# Patient Record
Sex: FEMALE | Race: BLACK OR AFRICAN AMERICAN | NOT HISPANIC OR LATINO | Employment: FULL TIME | URBAN - METROPOLITAN AREA
[De-identification: names, ages, dates, MRNs, and addresses within clinical notes are randomized per-mention and may not be internally consistent; named-entity substitution may affect disease eponyms.]

---

## 2017-10-05 ENCOUNTER — HOSPITAL ENCOUNTER (INPATIENT)
Facility: HOSPITAL | Age: 32
LOS: 2 days | Discharge: HOME | End: 2017-10-07
Attending: OBSTETRICS & GYNECOLOGY | Admitting: OBSTETRICS & GYNECOLOGY
Payer: MEDICAID

## 2017-10-05 DIAGNOSIS — O00.91 ECTOPIC PREGNANCY WITH INTRAUTERINE PREGNANCY, UNSPECIFIED LOCATION: ICD-10-CM

## 2017-10-05 DIAGNOSIS — Z34.90 EARLY STAGE OF PREGNANCY: ICD-10-CM

## 2017-10-05 DIAGNOSIS — O00.90 ECTOPIC PREGNANCY: ICD-10-CM

## 2017-10-05 DIAGNOSIS — N83.519 OVARIAN TORSION: Primary | ICD-10-CM

## 2017-10-05 PROCEDURE — G0379 DIRECT REFER HOSPITAL OBSERV: HCPCS

## 2017-10-05 PROCEDURE — G0378 HOSPITAL OBSERVATION PER HR: HCPCS

## 2017-10-05 PROCEDURE — 11000001 HC ACUTE MED/SURG PRIVATE ROOM

## 2017-10-05 RX ORDER — HYDROMORPHONE HYDROCHLORIDE 2 MG/ML
2 INJECTION, SOLUTION INTRAMUSCULAR; INTRAVENOUS; SUBCUTANEOUS EVERY 4 HOURS PRN
Status: DISCONTINUED | OUTPATIENT
Start: 2017-10-05 | End: 2017-10-06

## 2017-10-05 RX ORDER — SODIUM CHLORIDE, SODIUM LACTATE, POTASSIUM CHLORIDE, CALCIUM CHLORIDE 600; 310; 30; 20 MG/100ML; MG/100ML; MG/100ML; MG/100ML
INJECTION, SOLUTION INTRAVENOUS CONTINUOUS
Status: DISCONTINUED | OUTPATIENT
Start: 2017-10-06 | End: 2017-10-06

## 2017-10-06 ENCOUNTER — SURGERY (OUTPATIENT)
Age: 32
End: 2017-10-06

## 2017-10-06 ENCOUNTER — ANESTHESIA EVENT (OUTPATIENT)
Dept: SURGERY | Facility: HOSPITAL | Age: 32
End: 2017-10-06
Payer: MEDICAID

## 2017-10-06 ENCOUNTER — ANESTHESIA (OUTPATIENT)
Dept: SURGERY | Facility: HOSPITAL | Age: 32
End: 2017-10-06
Payer: MEDICAID

## 2017-10-06 VITALS — HEART RATE: 105 BPM | DIASTOLIC BLOOD PRESSURE: 71 MMHG | SYSTOLIC BLOOD PRESSURE: 128 MMHG | OXYGEN SATURATION: 97 %

## 2017-10-06 PROBLEM — N83.519 OVARIAN TORSION: Status: ACTIVE | Noted: 2017-10-06

## 2017-10-06 LAB
BASOPHILS # BLD AUTO: 0.02 K/UL
BASOPHILS NFR BLD: 0.1 %
DIFFERENTIAL METHOD: ABNORMAL
EOSINOPHIL # BLD AUTO: 0 K/UL
EOSINOPHIL NFR BLD: 0 %
ERYTHROCYTE [DISTWIDTH] IN BLOOD BY AUTOMATED COUNT: 13.2 %
HCG INTACT+B SERPL-ACNC: NORMAL MIU/ML
HCT VFR BLD AUTO: 33 %
HGB BLD-MCNC: 10.9 G/DL
LYMPHOCYTES # BLD AUTO: 1.5 K/UL
LYMPHOCYTES NFR BLD: 7.1 %
MCH RBC QN AUTO: 31.7 PG
MCHC RBC AUTO-ENTMCNC: 33 G/DL
MCV RBC AUTO: 96 FL
MONOCYTES # BLD AUTO: 0.7 K/UL
MONOCYTES NFR BLD: 3.6 %
NEUTROPHILS # BLD AUTO: 18 K/UL
NEUTROPHILS NFR BLD: 88.8 %
PLATELET # BLD AUTO: 347 K/UL
PMV BLD AUTO: 11.3 FL
RBC # BLD AUTO: 3.44 M/UL
WBC # BLD AUTO: 20.3 K/UL

## 2017-10-06 PROCEDURE — 36415 COLL VENOUS BLD VENIPUNCTURE: CPT

## 2017-10-06 PROCEDURE — 25000003 PHARM REV CODE 250: Performed by: OBSTETRICS & GYNECOLOGY

## 2017-10-06 PROCEDURE — 88305 TISSUE EXAM BY PATHOLOGIST: CPT | Mod: 26,,, | Performed by: PATHOLOGY

## 2017-10-06 PROCEDURE — 71000039 HC RECOVERY, EACH ADD'L HOUR: Performed by: OBSTETRICS & GYNECOLOGY

## 2017-10-06 PROCEDURE — 37000009 HC ANESTHESIA EA ADD 15 MINS: Performed by: OBSTETRICS & GYNECOLOGY

## 2017-10-06 PROCEDURE — 25000003 PHARM REV CODE 250: Performed by: NURSE ANESTHETIST, CERTIFIED REGISTERED

## 2017-10-06 PROCEDURE — 84702 CHORIONIC GONADOTROPIN TEST: CPT

## 2017-10-06 PROCEDURE — 63600175 PHARM REV CODE 636 W HCPCS: Performed by: NURSE ANESTHETIST, CERTIFIED REGISTERED

## 2017-10-06 PROCEDURE — 36000709 HC OR TIME LEV III EA ADD 15 MIN: Performed by: OBSTETRICS & GYNECOLOGY

## 2017-10-06 PROCEDURE — 63600175 PHARM REV CODE 636 W HCPCS: Performed by: OBSTETRICS & GYNECOLOGY

## 2017-10-06 PROCEDURE — 0UT50ZZ RESECTION OF RIGHT FALLOPIAN TUBE, OPEN APPROACH: ICD-10-PCS | Performed by: OBSTETRICS & GYNECOLOGY

## 2017-10-06 PROCEDURE — 11000001 HC ACUTE MED/SURG PRIVATE ROOM

## 2017-10-06 PROCEDURE — 71000033 HC RECOVERY, INTIAL HOUR: Performed by: OBSTETRICS & GYNECOLOGY

## 2017-10-06 PROCEDURE — 27201423 OPTIME MED/SURG SUP & DEVICES STERILE SUPPLY: Performed by: OBSTETRICS & GYNECOLOGY

## 2017-10-06 PROCEDURE — 37000008 HC ANESTHESIA 1ST 15 MINUTES: Performed by: OBSTETRICS & GYNECOLOGY

## 2017-10-06 PROCEDURE — 88305 TISSUE EXAM BY PATHOLOGIST: CPT | Performed by: PATHOLOGY

## 2017-10-06 PROCEDURE — 85025 COMPLETE CBC W/AUTO DIFF WBC: CPT

## 2017-10-06 PROCEDURE — 63600175 PHARM REV CODE 636 W HCPCS: Performed by: ANESTHESIOLOGY

## 2017-10-06 PROCEDURE — 58720 REMOVAL OF OVARY/TUBE(S): CPT | Mod: ,,, | Performed by: OBSTETRICS & GYNECOLOGY

## 2017-10-06 PROCEDURE — 0UT00ZZ RESECTION OF RIGHT OVARY, OPEN APPROACH: ICD-10-PCS | Performed by: OBSTETRICS & GYNECOLOGY

## 2017-10-06 PROCEDURE — 36000708 HC OR TIME LEV III 1ST 15 MIN: Performed by: OBSTETRICS & GYNECOLOGY

## 2017-10-06 RX ORDER — PROPOFOL 10 MG/ML
VIAL (ML) INTRAVENOUS
Status: DISCONTINUED | OUTPATIENT
Start: 2017-10-06 | End: 2017-10-06

## 2017-10-06 RX ORDER — HYDROMORPHONE HYDROCHLORIDE 2 MG/ML
2 INJECTION, SOLUTION INTRAMUSCULAR; INTRAVENOUS; SUBCUTANEOUS EVERY 4 HOURS PRN
Status: DISCONTINUED | OUTPATIENT
Start: 2017-10-06 | End: 2017-10-07 | Stop reason: HOSPADM

## 2017-10-06 RX ORDER — ACETAMINOPHEN 10 MG/ML
INJECTION, SOLUTION INTRAVENOUS
Status: DISCONTINUED | OUTPATIENT
Start: 2017-10-06 | End: 2017-10-06

## 2017-10-06 RX ORDER — SODIUM CHLORIDE, SODIUM LACTATE, POTASSIUM CHLORIDE, CALCIUM CHLORIDE 600; 310; 30; 20 MG/100ML; MG/100ML; MG/100ML; MG/100ML
INJECTION, SOLUTION INTRAVENOUS CONTINUOUS PRN
Status: DISCONTINUED | OUTPATIENT
Start: 2017-10-06 | End: 2017-10-06

## 2017-10-06 RX ORDER — ONDANSETRON 2 MG/ML
8 INJECTION INTRAMUSCULAR; INTRAVENOUS EVERY 8 HOURS PRN
Status: DISCONTINUED | OUTPATIENT
Start: 2017-10-06 | End: 2017-10-07 | Stop reason: HOSPADM

## 2017-10-06 RX ORDER — OXYCODONE AND ACETAMINOPHEN 5; 325 MG/1; MG/1
1 TABLET ORAL EVERY 4 HOURS PRN
Status: DISCONTINUED | OUTPATIENT
Start: 2017-10-06 | End: 2017-10-07 | Stop reason: HOSPADM

## 2017-10-06 RX ORDER — OXYCODONE AND ACETAMINOPHEN 5; 325 MG/1; MG/1
1 TABLET ORAL EVERY 4 HOURS PRN
Qty: 30 TABLET | Refills: 0 | Status: SHIPPED | OUTPATIENT
Start: 2017-10-06 | End: 2017-10-20

## 2017-10-06 RX ORDER — BISACODYL 10 MG
10 SUPPOSITORY, RECTAL RECTAL DAILY PRN
Status: DISCONTINUED | OUTPATIENT
Start: 2017-10-06 | End: 2017-10-07 | Stop reason: HOSPADM

## 2017-10-06 RX ORDER — IBUPROFEN 600 MG/1
600 TABLET ORAL EVERY 6 HOURS
Status: DISCONTINUED | OUTPATIENT
Start: 2017-10-07 | End: 2017-10-07 | Stop reason: HOSPADM

## 2017-10-06 RX ORDER — OXYCODONE AND ACETAMINOPHEN 10; 325 MG/1; MG/1
1 TABLET ORAL EVERY 4 HOURS PRN
Status: DISCONTINUED | OUTPATIENT
Start: 2017-10-06 | End: 2017-10-07 | Stop reason: HOSPADM

## 2017-10-06 RX ORDER — ROCURONIUM BROMIDE 10 MG/ML
INJECTION, SOLUTION INTRAVENOUS
Status: DISCONTINUED | OUTPATIENT
Start: 2017-10-06 | End: 2017-10-06

## 2017-10-06 RX ORDER — ATROPINE SULFATE 0.4 MG/ML
INJECTION, SOLUTION ENDOTRACHEAL; INTRAMEDULLARY; INTRAMUSCULAR; INTRAVENOUS; SUBCUTANEOUS
Status: DISCONTINUED | OUTPATIENT
Start: 2017-10-06 | End: 2017-10-06

## 2017-10-06 RX ORDER — NEOSTIGMINE METHYLSULFATE 1 MG/ML
INJECTION, SOLUTION INTRAVENOUS
Status: DISCONTINUED | OUTPATIENT
Start: 2017-10-06 | End: 2017-10-06

## 2017-10-06 RX ORDER — FENTANYL CITRATE 50 UG/ML
INJECTION, SOLUTION INTRAMUSCULAR; INTRAVENOUS
Status: DISCONTINUED | OUTPATIENT
Start: 2017-10-06 | End: 2017-10-06

## 2017-10-06 RX ORDER — ONDANSETRON 2 MG/ML
8 INJECTION INTRAMUSCULAR; INTRAVENOUS ONCE
Status: DISCONTINUED | OUTPATIENT
Start: 2017-10-06 | End: 2017-10-06

## 2017-10-06 RX ORDER — SUCCINYLCHOLINE CHLORIDE 20 MG/ML
INJECTION INTRAMUSCULAR; INTRAVENOUS
Status: DISCONTINUED | OUTPATIENT
Start: 2017-10-06 | End: 2017-10-06

## 2017-10-06 RX ORDER — PHENYLEPHRINE HYDROCHLORIDE 10 MG/ML
INJECTION INTRAVENOUS
Status: DISCONTINUED | OUTPATIENT
Start: 2017-10-06 | End: 2017-10-06

## 2017-10-06 RX ORDER — SODIUM CHLORIDE 0.9 % (FLUSH) 0.9 %
3 SYRINGE (ML) INJECTION
Status: DISCONTINUED | OUTPATIENT
Start: 2017-10-06 | End: 2017-10-06

## 2017-10-06 RX ORDER — LIDOCAINE HCL/PF 100 MG/5ML
SYRINGE (ML) INTRAVENOUS
Status: DISCONTINUED | OUTPATIENT
Start: 2017-10-06 | End: 2017-10-06

## 2017-10-06 RX ORDER — ONDANSETRON HYDROCHLORIDE 2 MG/ML
INJECTION, SOLUTION INTRAMUSCULAR; INTRAVENOUS
Status: DISCONTINUED | OUTPATIENT
Start: 2017-10-06 | End: 2017-10-06

## 2017-10-06 RX ORDER — IBUPROFEN 600 MG/1
600 TABLET ORAL EVERY 6 HOURS
Qty: 30 TABLET | Refills: 0 | Status: SHIPPED | OUTPATIENT
Start: 2017-10-07 | End: 2017-12-13

## 2017-10-06 RX ORDER — HYDROMORPHONE HYDROCHLORIDE 2 MG/ML
0.4 INJECTION, SOLUTION INTRAMUSCULAR; INTRAVENOUS; SUBCUTANEOUS EVERY 5 MIN PRN
Status: DISCONTINUED | OUTPATIENT
Start: 2017-10-06 | End: 2017-10-06 | Stop reason: HOSPADM

## 2017-10-06 RX ADMIN — SODIUM CHLORIDE, SODIUM LACTATE, POTASSIUM CHLORIDE, AND CALCIUM CHLORIDE: .6; .31; .03; .02 INJECTION, SOLUTION INTRAVENOUS at 11:10

## 2017-10-06 RX ADMIN — ACETAMINOPHEN 1000 MG: 10 INJECTION, SOLUTION INTRAVENOUS at 12:10

## 2017-10-06 RX ADMIN — PROPOFOL 50 MG: 10 INJECTION, EMULSION INTRAVENOUS at 12:10

## 2017-10-06 RX ADMIN — HYDROMORPHONE HYDROCHLORIDE 2 MG: 2 INJECTION, SOLUTION INTRAMUSCULAR; INTRAVENOUS; SUBCUTANEOUS at 11:10

## 2017-10-06 RX ADMIN — LIDOCAINE HYDROCHLORIDE 80 MG: 20 INJECTION, SOLUTION INTRAVENOUS at 11:10

## 2017-10-06 RX ADMIN — ROCURONIUM BROMIDE 5 MG: 10 INJECTION, SOLUTION INTRAVENOUS at 11:10

## 2017-10-06 RX ADMIN — HYDROMORPHONE HYDROCHLORIDE 2 MG: 2 INJECTION, SOLUTION INTRAMUSCULAR; INTRAVENOUS; SUBCUTANEOUS at 12:10

## 2017-10-06 RX ADMIN — PROPOFOL 50 MG: 10 INJECTION, EMULSION INTRAVENOUS at 11:10

## 2017-10-06 RX ADMIN — HYDROMORPHONE HYDROCHLORIDE 0.4 MG: 2 INJECTION, SOLUTION INTRAMUSCULAR; INTRAVENOUS; SUBCUTANEOUS at 01:10

## 2017-10-06 RX ADMIN — HYDROMORPHONE HYDROCHLORIDE 2 MG: 2 INJECTION, SOLUTION INTRAMUSCULAR; INTRAVENOUS; SUBCUTANEOUS at 03:10

## 2017-10-06 RX ADMIN — PHENYLEPHRINE HYDROCHLORIDE 100 MCG: 10 INJECTION INTRAVENOUS at 12:10

## 2017-10-06 RX ADMIN — HYDROMORPHONE HYDROCHLORIDE 2 MG: 2 INJECTION, SOLUTION INTRAMUSCULAR; INTRAVENOUS; SUBCUTANEOUS at 04:10

## 2017-10-06 RX ADMIN — HYDROMORPHONE HYDROCHLORIDE 0.4 MG: 2 INJECTION, SOLUTION INTRAMUSCULAR; INTRAVENOUS; SUBCUTANEOUS at 02:10

## 2017-10-06 RX ADMIN — ATROPINE SULFATE 0.5 MG: 0.4 INJECTION, SOLUTION INTRAMUSCULAR; INTRAVENOUS; SUBCUTANEOUS at 12:10

## 2017-10-06 RX ADMIN — HYDROMORPHONE HYDROCHLORIDE 2 MG: 2 INJECTION, SOLUTION INTRAMUSCULAR; INTRAVENOUS; SUBCUTANEOUS at 07:10

## 2017-10-06 RX ADMIN — SODIUM CHLORIDE, SODIUM LACTATE, POTASSIUM CHLORIDE, AND CALCIUM CHLORIDE: 600; 310; 30; 20 INJECTION, SOLUTION INTRAVENOUS at 12:10

## 2017-10-06 RX ADMIN — SODIUM CHLORIDE, SODIUM LACTATE, POTASSIUM CHLORIDE, AND CALCIUM CHLORIDE: 600; 310; 30; 20 INJECTION, SOLUTION INTRAVENOUS at 08:10

## 2017-10-06 RX ADMIN — PROMETHAZINE HYDROCHLORIDE 25 MG: 25 INJECTION INTRAMUSCULAR; INTRAVENOUS at 04:10

## 2017-10-06 RX ADMIN — PROPOFOL 150 MG: 10 INJECTION, EMULSION INTRAVENOUS at 11:10

## 2017-10-06 RX ADMIN — ROCURONIUM BROMIDE 15 MG: 10 INJECTION, SOLUTION INTRAVENOUS at 12:10

## 2017-10-06 RX ADMIN — SUCCINYLCHOLINE CHLORIDE 120 MG: 20 INJECTION, SOLUTION INTRAMUSCULAR; INTRAVENOUS at 11:10

## 2017-10-06 RX ADMIN — ONDANSETRON 8 MG: 2 INJECTION, SOLUTION INTRAMUSCULAR; INTRAVENOUS at 12:10

## 2017-10-06 RX ADMIN — FENTANYL CITRATE 50 MCG: 50 INJECTION, SOLUTION INTRAMUSCULAR; INTRAVENOUS at 01:10

## 2017-10-06 RX ADMIN — NEOSTIGMINE METHYLSULFATE 3 MG: 1 INJECTION INTRAVENOUS at 12:10

## 2017-10-06 RX ADMIN — FENTANYL CITRATE 50 MCG: 50 INJECTION, SOLUTION INTRAMUSCULAR; INTRAVENOUS at 12:10

## 2017-10-06 RX ADMIN — FENTANYL CITRATE 100 MCG: 50 INJECTION, SOLUTION INTRAMUSCULAR; INTRAVENOUS at 11:10

## 2017-10-06 NOTE — PLAN OF CARE
Pt meets PACU discharge criteria. Pt signed out of PACU by Dr Guevara. Report called to Lawrence Cobb

## 2017-10-06 NOTE — H&P
GYNECOLOGY  :  Janie Barbosa is a 31 y.o.    Patient transferred from [facility]  , where she consulted for abdominal pain, severe, on the right lower quadrant, and vaginal bleeding . Patient  Knew was pregnant,,had seen a physician in [facility]   Pain started about 4 days ago , more severe yesterday  EValuated on ED outside facility, US reported ovarian mass possible ectopic pregnancy vs ovarian cyst , and no viable intrauterine pregnancy  Pain has been moderate since arrival , vaginal bleeding  Minimal   No fever, no nausea or vomiting , no GI symptoms            No past medical history on file.  Past Surgical History:   Procedure Laterality Date     SECTION      CYST REMOVAL      DILATION AND CURETTAGE OF UTERUS      SINUS SURGERY       No family history on file.  Social History   Substance Use Topics    Smoking status: Never Smoker    Smokeless tobacco: Never Used    Alcohol use No     OB History    Para Term  AB Living   2 0 0   2 2   SAB TAB Ectopic Multiple Live Births                  # Outcome Date GA Lbr Prosper/2nd Weight Sex Delivery Anes PTL Lv   2 AB            1 AB                   /69 (BP Location: Right arm, Patient Position: Lying)   Pulse 92   Temp 98.4 °F (36.9 °C) (Oral)   Resp 18   LMP 2017 Comment: not sure if she pregnant  SpO2 100%         COMPREHENSIVE GYN HISTORY:       PAP History: Denies abnormal Paps.  Infection History: Denies STDs. Denies PID.  Benign History: Denies uterine fibroids. Denies ovarian cysts. Denies endometriosis.   Cancer History: Denies cervical cancer. Denies uterine cancer or hyperplasia. Denies ovarian cancer. Denies vulvar cancer or pre-cancer. Denies vaginal cancer or pre-cancer. Denies breast cancer. Denies colon cancer.  Sexual Activity History: ( x ) Yes   (  )   no       ROS  GENERAL: Denies significant weight gain or weight loss. Feeling well overall.  SKIN: Denies rash or lesions.  Normal  skin turgor  HEAD: Denies head injury or headache.   NODES: Denies enlarged lymph nodes.   CHEST: Denies chest pain or shortness of breath.   CARDIOVASCULAR: Denies palpitations or left sided chest pain.   ABDOMEN: Per HPIURINARY: normal  Frequency,no  Dysuria or burning on urination.   REPRODUCTIVE: Per HPI   BREASTS: The patient sometimes performs breast self-examination and denies pain, lumps, or nipple discharge.   HEMATOLOGIC: No easy bruisability or excessive bleeding.   MUSCULOSKELETAL: Denies joint pain or swelling.   NEUROLOGIC: Denies syncope or weakness.   PSYCHIATRIC: Denies depression, anxiety or mood swings.    Physical Exam     Constitutional: She is oriented to person, place, and time. She appears well-developed and well-nourished. No distress.   HENT:   Head: Normocephalic.   NECK: Neck symmetric without masses or thyromegaly.  Cardiovascular: Normal rate and regular rhythm.   Pulmonary/Chest: Effort normal and breath sounds normal. No respiratory distress. She has no wheezes.   Breasts: Symmetrical, no skin changes or visible lesions. No palpable masses, nipple discharge or adenopathy bilaterally.  Abdominal: Distended, very tender mostly llower abdomen and right lower quadrant  Positive guarding but not clear rebound   Genitourinary: Pelvic exam was performed with patient supine.   External genitalia normal no lesions.Normal hair distribution   Adequate perineal body,normal urethral meatus   Bimanual exam   Cervix closed, positive CMT, cervix is displaced anteriorly, and posterior cul de sac feels full and extremely tender to palpation   Difficult to feel uterus or adnexa due to  Patient discomfort and abdominal guarding     Extremities normal no cyanosis ,edema.     Procedures:  Lab Results   Component Value Date    WBC 20.30 (H) 10/06/2017    HGB 10.9 (L) 10/06/2017    HCT 33.0 (L) 10/06/2017    MCV 96 10/06/2017     10/06/2017     C = 7385  Pelvic US =  (telephonic report given by  radiologist to me) .  Viable intrauterine pregnancy ,   Right adnexal mass  about 8 cms, cystic ,hemorrhagic, free fluid in cul de sac  Doppler= absent flow to right ovary concerning for torsion     A/P Janie Barbosa 31 y.o.     Dx=  1- Acute abdomen   2- Pelvic Mass   3- Intrauterine pregnancy       Threatened miscarriage   4- Ruptured and possible torsion of  hemorrhagic ovarian cyst       Plan:  With this clinical and Ultrasound findings   It is decided for surgical management   Will start with diagnostic laparoscopy , and per findings ovarian cystectomy possible   oophorectomy   Expectant management for now of intrauterine pregnancy , the patient is already bleeding and the future outcome is still unknown  Patient counseled about possible effects of surgery in this early pregnancy.   Patient agrees with plan  Consents signed   Will proceed with surgery     David Gary M.D.   OB/GYN

## 2017-10-06 NOTE — OP NOTE
OPERATIVE REPORT:    Procedure date: 10/6/2017    PREOPERATIVE DIAGNOSIS  1.Pelvic mass possible ovarian torsion  2-IUP (early )       POSTOPERATIVE DIAGNOSIS  1 Right ovarian Torsion   2-Hemoperitoneum     PROCEDURE:   Diagnostic laparoscopy converted to exploratory  laparotomy     Right salpingo- oophorectomy   Pelvic washout         SURGEON: Dr.Juancarlos Gary  ASSISTANT: Dana ilang     ANESTHESIA: General    COMPLICATIONS: None    EBL:less 200  Cc    Specimen to Pathology : right tube and ovary       URINE OUTPUT: clear at the end     FINDINGS:   Uterus = normal   right ovary=  Ovarian torsion, hemorrhagic , increased in size to 7-8 cms, ruptured , bleeding,   Multiple clots and free blood in pelvic              David Gary M.D.   OB/GYN    10/6/2017

## 2017-10-06 NOTE — PROGRESS NOTES
"Pt sitting up in bed with significant other at side.  Assessment complete.  See flowsheet.  PT denies c/o pain while lying in bed.  Pain on ride side when palpating but no rebound tenderness.  Pt denies vaginal bleeding.      0755  Dr Gary at bedside assessng patient.  SVE done per MD and "old" brown blood noted to glove after exam.  Pt tolerated well.  Order received for beta HCG and pt will go for ultrasound this A.M.    0805  Ultrasound tech called unit and will send for patient through transport services.  Notified pt that transport personnel will be coming soon to bring herto ultrasound.  Pt verbalized understanding.    0857  Pt transported to /S via wheelchair  0957  Pt arrived back in her room from /s  1124  Pt transported to surgery via stretcher in stable condition.  Dr Gary will see patient and get informed consent in surgery holding prior to surgery.      1435  Pt arrived to room 352 via stretcher in stable condition    1445  Pt awake and oriented.  Assessment complete.  See flowsheet.  IV to left AC leaking and looks to be reinforced with gauze from surgery.  IVF stopped and will restart IV.    1450  Attempts to start IV to right hand without success  1500  IV restarted to right AC.  Pt tolerated well.  1530  Dr Gary at bedside explaining procedure to aptient.  PT verbalized understanding.  Pt c/o incisional pain.  Orders received for IV pain medication since pt not eating yet.  "

## 2017-10-06 NOTE — PLAN OF CARE
Pt arrived to unit. Patient oriented to room. Full assessment completed. VSS. See flowsheet.     5727 Spoke to Dr. Gary about patient arriving to unit and crying complaining pain 10/10 to R lower abdomen. VSS. New orders received. MD states will assess patient in AM.     0000 Pt medicated for pain. Plan of care discussed with patient. Pt verbalizes full understanding.    0030 Pt resting comfortably in bed. No complaints or needs verbalized at this time.     0415 Pt c/o pain, nausea, and vomiting. Dilaudid and Phenergan given as ordered. Patient states the Dilaudid is helping relieve her pain and she was able to sleep.

## 2017-10-06 NOTE — TRANSFER OF CARE
Anesthesia Transfer of Care Note    Patient: Janie Barbosa    Procedure(s) Performed: Procedure(s) (LRB):  LAPAROSCOPY-DIAGNOSTIC (Right)  SALPINGO-OOPHERECTOMY (Right)    Patient location: PACU    Anesthesia Type: general    Transport from OR: Transported from OR on 6-10 L/min O2 by face mask with adequate spontaneous ventilation    Post pain: adequate analgesia    Post assessment: no apparent anesthetic complications    Post vital signs: stable    Level of consciousness: sedated    Nausea/Vomiting: no nausea/vomiting    Complications: none    Transfer of care protocol was followed      Last vitals:   Visit Vitals  /75   Pulse (!) 54   Temp 36.3 °C (97.3 °F) (Temporal)   Resp 14   Wt 71.7 kg (158 lb)   LMP 08/26/2017   SpO2 99%   BMI 27.12 kg/m²

## 2017-10-06 NOTE — OP NOTE
OPERATIVE REPORT:    Procedure date: 10/6/2017    PREOPERATIVE DIAGNOSIS  1.Pelvic mass / Hemorrhagic Ovarian cyst     Possible Ovarian torsion    Intrauterine  pregnancy     POSTOPERATIVE DIAGNOSIS  1.Ovarian torsion      Right ruptured hemorrhagic cyst      Hemoperitoneum     PROCEDURE:   Diagnostic laparoscopy converted to laparotomy   Right Salpingo oophorectomy  + pelvic washout       SURGEON: Dr.Juancarlos Gary  ASSISTANT: Dana liang     ANESTHESIA: General    COMPLICATIONS: None    EBL: about 200  Cc of blood found  in pelvis and abdominal cavity       Specimen to Pathology : Right Tube and ovary       URINE OUTPUT: clear at the end     FINDINGS:   Uterus = Increased in size , normal surface   Normal left tube and ovary   Right adnexa.  Big irregular hemorrhagic mass, about 8x9 cms ruptured and bleeding ,originated in the right ovary, tube is swollen and  Involved in the mass.   Long pedicle , appears twisted on its axis  Hemoperitoneum 200 cc aprox            PROCEDURE:   Patient was taking to the operating room where general anesthesia was administered and found to be adequate.   She was prepped and draped in the dorsal lithotomy position.a sponge stick was placed in vagina   Gloves were changed. Infraumbilical incision 1.5 cm was made with scalpel A Veress needle was inserted into the umbilicus under tenting of the anterior abdominal wall. Placement into the peritoneal cavity was confirmed via saline drop test. The abdomen was insufflated to 15mm Hg using Carbon dioxide. A 10 mm Optiview trocar was advanced through this incision. Excellent hemostasis was noted. The patient was placed in deep Trendelenburg. An 5 mm left lateral skin incision was made with a scalpel and a 5 mm trocar was advanced through this incision under visualization of the camera. A 5 mm right lateral skin incision was made with the scalpel. A 5 mm trocar was advanced through this incision under visualization of the camera.  Excellent hemostasis was noted.     Evaluation of pelvic cavity was performed with findings described.  Using the aspirator, we try to aspirate cyst contents but are  thick and organized. Bleeding is active.   It is decided at this point o convert to open laparotomy .  A Pfannenstiel laparotomy incision was made and was extended Through the subcutaneous tissue to the fascia, the fascia was incised  Transversally and muscles were dissected off the fascia with Bovie and curved mora scissors   ,the midline was identified and rectus muscles were  laterally, then parietal peritoneum was tented up and entered , exposing the pelvic organs with findings described.  An O'emiliano-Snow retractor was placed and 3 moist laps were  used to pack and retract bowel.  Pelvic mass identified, and using Liga Sure impact device, the infundibulopelvic ligament is double cauterized and cut,  Mesosalpinx is also cauterized and cut removing ovary and tube in bulk.. Pelvis was irrigated and all clots in anterior and posterior cul de sac removed .   All instruments removed from abdomen, lap/sponges counts were made  Peritoneum is closed with 2-0 CC, fascia with 0 PDS  , subcutaneous with plain catgut  Running non locking and skin with 4-0 monocril subcuticular    Patient was awakened and taken to recovery room in good condition .  No complications.  Urine clear a the endo of procedure        David Gary M.D.   OB/GYN    10/6/2017

## 2017-10-06 NOTE — ANESTHESIA POSTPROCEDURE EVALUATION
Anesthesia Post Evaluation    Patient: Janie Barbosa    Procedure(s) Performed: Procedure(s) (LRB):  LAPAROSCOPY-DIAGNOSTIC (Right)  SALPINGO-OOPHERECTOMY (Right)    Final Anesthesia Type: general  Patient location during evaluation: PACU  Patient participation: Yes- Able to Participate  Level of consciousness: awake and alert  Post-procedure vital signs: reviewed and stable  Pain management: adequate  Airway patency: patent  PONV status at discharge: No PONV  Anesthetic complications: no      Cardiovascular status: hemodynamically stable and blood pressure returned to baseline  Respiratory status: unassisted and spontaneous ventilation  Hydration status: euvolemic  Follow-up not needed.        Visit Vitals  /66   Pulse 72   Temp 36.3 °C (97.3 °F) (Temporal)   Resp 12   Wt 71.7 kg (158 lb)   LMP 08/26/2017   SpO2 98%   BMI 27.12 kg/m²       Pain/My Score: Pain Assessment Performed: Yes (10/6/2017  2:20 PM)  Presence of Pain: complains of pain/discomfort (10/6/2017  2:20 PM)  Pain Rating Prior to Med Admin: 8 (10/6/2017  2:13 PM)  Pain Rating Post Med Admin: 0 (10/6/2017  4:45 AM)  My Score: 9 (10/6/2017  2:20 PM)

## 2017-10-06 NOTE — ANESTHESIA PREPROCEDURE EVALUATION
10/06/2017  Janie Barbosa is a 31 y.o., female with ovarian torsion, IUP at 7 weeks and bleeding, nausea for diagnostic laparoscopy under GETA.     Anesthesia Evaluation     I have reviewed the Nursing Notes.   I have reviewed the Medications.     Review of Systems  Anesthesia Hx:   Denies Personal Hx of Anesthesia complications.   Social:  Non-Smoker, No Alcohol Use   Cardiovascular:   Exercise tolerance: good    Pulmonary:  Pulmonary Normal    OB/GYN/PEDS:  Ovarian torsion       Physical Exam  General:  Well nourished    Airway/Jaw/Neck:  Airway Findings: Mouth Opening: Normal Tongue: Normal  General Airway Assessment: Adult  Mallampati: I  TM Distance: Normal, at least 6 cm      Dental:  Dental Findings: In tact   Chest/Lungs:  Chest/Lungs Clear    Heart/Vascular:  Heart Findings: Normal       Mental Status:  Mental Status Findings:  Alert and Oriented       Lab Results   Component Value Date    WBC 20.30 (H) 10/06/2017    HGB 10.9 (L) 10/06/2017    HCT 33.0 (L) 10/06/2017     10/06/2017    ALT 20 10/05/2017    AST 15 10/05/2017     10/05/2017    K 3.3 (L) 10/05/2017     10/05/2017    CREATININE 0.66 10/05/2017    BUN 7 10/05/2017    CO2 17 (L) 10/05/2017    INR 1.0 11/24/2008         Anesthesia Plan  Type of Anesthesia, risks & benefits discussed:  Anesthesia Type:  general  Patient's Preference:   Intra-op Monitoring Plan:   Intra-op Monitoring Plan Comments:   Post Op Pain Control Plan:   Post Op Pain Control Plan Comments:   Induction:    Beta Blocker:  Patient is not currently on a Beta-Blocker (No further documentation required).       Informed Consent: Patient understands risks and agrees with Anesthesia plan.  Questions answered. Anesthesia consent signed with patient.  ASA Score: 1  emergent   Day of Surgery Review of History & Physical:            Ready For Surgery From  Anesthesia Perspective.

## 2017-10-07 VITALS
WEIGHT: 158 LBS | DIASTOLIC BLOOD PRESSURE: 61 MMHG | BODY MASS INDEX: 27.12 KG/M2 | OXYGEN SATURATION: 100 % | TEMPERATURE: 98 F | SYSTOLIC BLOOD PRESSURE: 110 MMHG | HEART RATE: 113 BPM | RESPIRATION RATE: 16 BRPM

## 2017-10-07 LAB
BASOPHILS # BLD AUTO: 0.02 K/UL
BASOPHILS NFR BLD: 0.1 %
DIFFERENTIAL METHOD: ABNORMAL
EOSINOPHIL # BLD AUTO: 0 K/UL
EOSINOPHIL NFR BLD: 0.1 %
ERYTHROCYTE [DISTWIDTH] IN BLOOD BY AUTOMATED COUNT: 13.4 %
HCT VFR BLD AUTO: 30.4 %
HGB BLD-MCNC: 9.9 G/DL
LYMPHOCYTES # BLD AUTO: 2.1 K/UL
LYMPHOCYTES NFR BLD: 15.5 %
MCH RBC QN AUTO: 31.5 PG
MCHC RBC AUTO-ENTMCNC: 32.6 G/DL
MCV RBC AUTO: 97 FL
MONOCYTES # BLD AUTO: 1.6 K/UL
MONOCYTES NFR BLD: 11.8 %
NEUTROPHILS # BLD AUTO: 10 K/UL
NEUTROPHILS NFR BLD: 72.2 %
PLATELET # BLD AUTO: 327 K/UL
PMV BLD AUTO: 11 FL
RBC # BLD AUTO: 3.14 M/UL
WBC # BLD AUTO: 13.8 K/UL

## 2017-10-07 PROCEDURE — 63600175 PHARM REV CODE 636 W HCPCS: Performed by: OBSTETRICS & GYNECOLOGY

## 2017-10-07 PROCEDURE — 85025 COMPLETE CBC W/AUTO DIFF WBC: CPT

## 2017-10-07 PROCEDURE — 36415 COLL VENOUS BLD VENIPUNCTURE: CPT

## 2017-10-07 RX ADMIN — HYDROMORPHONE HYDROCHLORIDE 2 MG: 2 INJECTION, SOLUTION INTRAMUSCULAR; INTRAVENOUS; SUBCUTANEOUS at 03:10

## 2017-10-07 NOTE — PROGRESS NOTES
POD #1    S: Patient doing well. Tolerating PO. Passing flatus. Ambulating. Pain controlled. No vaginal bleeding.    O  Temp:  [97.3 °F (36.3 °C)-98.7 °F (37.1 °C)] 98.4 °F (36.9 °C)  Pulse:  [] 113  Resp:  [12-20] 16  SpO2:  [97 %-100 %] 100 %  BP: ()/(61-88) 110/61   Gen: A&ox3, nAD  Abd: soft, incision c/d/i, no ttp  Ext: no edema    Lab Results   Component Value Date    WBC 13.80 (H) 10/07/2017    HGB 9.9 (L) 10/07/2017    HCT 30.4 (L) 10/07/2017    MCV 97 10/07/2017     10/07/2017       AP: s/p exlap and RSO, for right ovarian torsion, doing well  - continue routine postop care  -discharge home today  -repeat pelvic US completed yesterday and continues to show IUP @ 6+ weeks, +DANIELA young MD

## 2017-10-07 NOTE — DISCHARGE SUMMARY
[facility]  Obstetrics  Discharge Summary      Patient Name: Janie Barbosa  MRN: [id number]  Admission Date: 10/5/2017  Hospital Length of Stay: 1 days  Discharge Date and Time:  10/07/2017 11:29 AM  Attending Physician: David Gary MD   Discharging Provider: Austen Lciona MD  Primary Care Provider: Primary Doctor No    HPI: 32 yo now  female with IUP at 6wks gestation admitted for management of right ovarian torsion.    Procedure(s) (LRB):  LAPAROSCOPY-DIAGNOSTIC (Right)  SALPINGO-OOPHERECTOMY (Right)     Hospital Course: The patient's procedure was uncomplicated. Please see Dr Gary' operative note for details. The patient's postop course was uncomplicated. On day of discharge, she was tolerating PO. Her pain was well controlled. She was voiding, ambulating.    Final Active Diagnoses:    Diagnosis Date Noted POA    PRINCIPAL PROBLEM:  Ovarian torsion [N83.519] 10/06/2017 Yes    Ectopic pregnancy [O00.90] 10/05/2017 Yes      Problems Resolved During this Admission:    Diagnosis Date Noted Date Resolved POA        Immunizations     None          This patient has no babies on file.  Pending Diagnostic Studies:     None          Discharged Condition: good    Disposition: Home or Self Care    Follow Up:  Follow-up Information     David Gary MD In 1 week.    Specialty:  Obstetrics and Gynecology  Contact information:  [address]  [address] [place] [address] [id number] [phone]                 Patient Instructions:     Diet general     Activity as tolerated       Medications:  Current Discharge Medication List      START taking these medications    Details   ibuprofen (ADVIL,MOTRIN) 600 MG tablet Take 1 tablet (600 mg total) by mouth every 6 (six) hours.  Qty: 30 tablet, Refills: 0      oxycodone-acetaminophen (PERCOCET) 5-325 mg per tablet Take 1 tablet by mouth every 4 (four) hours as needed.  Qty: 30 tablet, Refills: 0         CONTINUE these medications which have NOT  CHANGED    Details   ondansetron (ZOFRAN-ODT) 8 MG TbDL Take 1 tablet (8 mg total) by mouth every 12 (twelve) hours as needed (nausea and vomiting).  Qty: 30 tablet, Refills: 0      pantoprazole (PROTONIX) 20 MG tablet Take 1 tablet (20 mg total) by mouth once daily.  Qty: 30 tablet, Refills: 0             Austen Licona MD  Obstetrics [facility]

## 2017-10-07 NOTE — PLAN OF CARE
Problem: Patient Care Overview  Goal: Plan of Care Review  Outcome: Ongoing (interventions implemented as appropriate)  Pt tolerating regular diet, robbins catheter to be discontinued at 0600, vss, nad.  Pain managed well with ordered medication.

## 2017-10-07 NOTE — PROGRESS NOTES
Discharge instructions given.  Pt verbalized understanding    1240  Discharged via wheelchair in stable condition;  NPIS 0/10

## 2017-10-07 NOTE — DISCHARGE INSTRUCTIONS
It often takes 1-4 weeks to recover from your surgery.  This varies from women to women.    To avoid constipation, eat fruits, vegetables, and whole grains.  Drink plenty of water.  Your doctor may suggest that you use a laxative or a mild stool softener.    Increase activity gradually.  Ask your friends and family to help with shores and errands while you recover.      Do not lift anything over 10 pounds to avoid straining your incision.    You may shower    Do not put anything in your vagina until your doctor says it is safe to do so.  This includes tampons, douching and sexual intercourse.    CALL YOUR DOCTOR    *Chills or a fever of 100.4 or higher    *Bright red vaginal bleeding or foul smelling discharge    *Difficulty urinating or burning during urination    *Severe abdominal pain or bloating    *Red, swollen, draining or bleeding incision site.

## 2017-10-09 ENCOUNTER — TELEPHONE (OUTPATIENT)
Dept: OBSTETRICS AND GYNECOLOGY | Facility: CLINIC | Age: 32
End: 2017-10-09

## 2017-10-09 NOTE — TELEPHONE ENCOUNTER
----- Message from Georgia Anderson sent at 10/9/2017 12:54 PM CDT -----  Contact: [phone]  Patient is requesting to speak with you regarding an appt. asap for a post-op

## 2017-10-19 ENCOUNTER — TELEPHONE (OUTPATIENT)
Dept: OBSTETRICS AND GYNECOLOGY | Facility: HOSPITAL | Age: 32
End: 2017-10-19

## 2017-10-20 ENCOUNTER — OFFICE VISIT (OUTPATIENT)
Dept: OBSTETRICS AND GYNECOLOGY | Facility: CLINIC | Age: 32
End: 2017-10-20
Payer: COMMERCIAL

## 2017-10-20 VITALS
DIASTOLIC BLOOD PRESSURE: 60 MMHG | SYSTOLIC BLOOD PRESSURE: 116 MMHG | HEIGHT: 64 IN | BODY MASS INDEX: 26.79 KG/M2 | WEIGHT: 156.94 LBS

## 2017-10-20 DIAGNOSIS — Z98.890 POSTOPERATIVE STATE: Primary | ICD-10-CM

## 2017-10-20 PROCEDURE — 99024 POSTOP FOLLOW-UP VISIT: CPT | Mod: S$GLB,,, | Performed by: OBSTETRICS & GYNECOLOGY

## 2017-10-20 PROCEDURE — 99999 PR PBB SHADOW E&M-EST. PATIENT-LVL II: CPT | Mod: PBBFAC,,, | Performed by: OBSTETRICS & GYNECOLOGY

## 2017-10-23 ENCOUNTER — TELEPHONE (OUTPATIENT)
Dept: OBSTETRICS AND GYNECOLOGY | Facility: CLINIC | Age: 32
End: 2017-10-23

## 2017-10-23 NOTE — TELEPHONE ENCOUNTER
----- Message from Luciana Gilmore sent at 10/23/2017  9:22 AM CDT -----  Contact: self/[phone]  Patient called to speak with nurse about getting a return to work excuse and also when will she be released to return to work.    Please call and advise.

## 2017-10-26 ENCOUNTER — TELEPHONE (OUTPATIENT)
Dept: OBSTETRICS AND GYNECOLOGY | Facility: CLINIC | Age: 32
End: 2017-10-26

## 2017-11-01 NOTE — PROGRESS NOTES
"Janie Barbosa is a 31 y.o. female  post-op from a  Laparotomy , Right salping oophorectomy  on 10/6/17 .  Patient is Doing well postoperatively.      The pathology revealed:  Benign  Hemorrhagic cyst       History reviewed. No pertinent past medical history.  Past Surgical History:   Procedure Laterality Date     SECTION      CYST REMOVAL      DILATION AND CURETTAGE OF UTERUS      ECTOPIC PREGNANCY SURGERY      SINUS SURGERY       History reviewed. No pertinent family history.  Social History   Substance Use Topics    Smoking status: Never Smoker    Smokeless tobacco: Never Used    Alcohol use No     OB History    Para Term  AB Living   5 2 2   2 2   SAB TAB Ectopic Multiple Live Births           2      # Outcome Date GA Lbr Prosper/2nd Weight Sex Delivery Anes PTL Lv   5 Term 07    F CS-LTranv   DANDRE   4 Term 04    M CS-LTranv   DANDRE   3             2 AB            1 AB                   /60   Ht 5' 4" (1.626 m)   Wt 71.2 kg (156 lb 15.5 oz)   LMP 2017 Comment: not sure if she pregnant  BMI 26.94 kg/m²     ROS:  GENERAL: No fever, chills, fatigability or weight loss.  VULVAR: No pain, no lesions and no itching.  VAGINAL: No relaxation, no itching, no discharge, no abnormal bleeding and no lesions.  ABDOMEN: No abdominal pain. Denies nausea. Denies vomiting. No diarrhea. No constipation  BREAST: Denies pain. No lumps. No discharge.  URINARY: No incontinence, no nocturia, no frequency and no dysuria.  CARDIOVASCULAR: No chest pain. No shortness of breath. No leg cramps.  NEUROLOGICAL: No headaches. No vision changes.    PE:   ABDOMEN:  Soft , not distended,not tender  BS present. incisions healing, clean, intact.   PELVIC      Normal external genitalia without lesions.  Normal hair distribution.  Adequate perineal body, normal urethral meatus.  Vagina moist and well rugated without lesions or discharge.  Cervix pink, without lesions, discharge or " tenderness.  No significant cystocele or rectocele.  Bimanual exam shows uterus to be normal size, regular, mobile and nontender.  Adnexa without masses or tenderness.        A/P   1- Satisfactory postoperative   2-s/p  Laparotomy salpingo oophorectomy      Intra operative findings and pathology results discussed with patient    Follow up   4 weeks   Or with her OB at [facility]         David Gary M.D.   OB/GYN

## 2017-12-13 ENCOUNTER — ROUTINE PRENATAL (OUTPATIENT)
Dept: OBSTETRICS AND GYNECOLOGY | Facility: CLINIC | Age: 32
End: 2017-12-13
Payer: COMMERCIAL

## 2017-12-13 VITALS
BODY MASS INDEX: 26.64 KG/M2 | WEIGHT: 155.19 LBS | SYSTOLIC BLOOD PRESSURE: 100 MMHG | DIASTOLIC BLOOD PRESSURE: 62 MMHG

## 2017-12-13 DIAGNOSIS — Z98.890 POSTOPERATIVE STATE: Primary | ICD-10-CM

## 2017-12-13 PROCEDURE — 99999 PR PBB SHADOW E&M-EST. PATIENT-LVL II: CPT | Mod: PBBFAC,,, | Performed by: OBSTETRICS & GYNECOLOGY

## 2017-12-13 PROCEDURE — 99024 POSTOP FOLLOW-UP VISIT: CPT | Mod: S$GLB,,, | Performed by: OBSTETRICS & GYNECOLOGY

## 2017-12-13 RX ORDER — DOXYLAMINE SUCCINATE AND PYRIDOXINE HYDROCHLORIDE 10; 10 MG/1; MG/1
TABLET, DELAYED RELEASE ORAL
COMMUNITY
Start: 2017-11-02 | End: 2019-03-24

## 2017-12-13 NOTE — PROGRESS NOTES
Janie Barbosa is a 32 y.o. female  post-op from a  Laparotomy , Right salping oophorectomy  on 10/6/17 .  Patient is Doing well postoperatively.      The pathology revealed:  Benign  Hemorrhagic cyst       History reviewed. No pertinent past medical history.  Past Surgical History:   Procedure Laterality Date     SECTION      CYST REMOVAL      DILATION AND CURETTAGE OF UTERUS      ECTOPIC PREGNANCY SURGERY      SINUS SURGERY       History reviewed. No pertinent family history.  Social History   Substance Use Topics    Smoking status: Never Smoker    Smokeless tobacco: Never Used    Alcohol use No     OB History    Para Term  AB Living   6 2 2   2 2   SAB TAB Ectopic Multiple Live Births           2      # Outcome Date GA Lbr Prosper/2nd Weight Sex Delivery Anes PTL Lv   6 Current            5 Term 07    F CS-LTranv   DANDRE   4 Term 04    M CS-LTranv   DANDRE   3             2 AB            1 AB                   /62   Wt 70.4 kg (155 lb 3.3 oz)   LMP 2017 Comment: not sure if she pregnant  BMI 26.64 kg/m²     ROS:  GENERAL: No fever, chills, fatigability or weight loss.  VULVAR: No pain, no lesions and no itching.  VAGINAL: No relaxation, no itching, no discharge, no abnormal bleeding and no lesions.  ABDOMEN: No abdominal pain. Denies nausea. Denies vomiting. No diarrhea. No constipation  BREAST: Denies pain. No lumps. No discharge.  URINARY: No incontinence, no nocturia, no frequency and no dysuria.  CARDIOVASCULAR: No chest pain. No shortness of breath. No leg cramps.  NEUROLOGICAL: No headaches. No vision changes.    PE:   ABDOMEN:  FH 15 cms . (+) fetal heart tones )     Soft , not distended,not tender  BS present. incisions healing, clean, intact.   PELVIC      Normal external genitalia without lesions.  Normal hair distribution.  Adequate perineal body, normal urethral meatus.  Vagina moist and well rugated without lesions or discharge.  Cervix  pink, without lesions, discharge or tenderness.  No significant cystocele or rectocele.  Bimanual exam shows uterus to be normal size, regular, mobile and nontender.  Adnexa without masses or tenderness.        A/P   1- Satisfactory postoperative   2-s/p  Laparotomy salpingo oophorectomy      Intra operative findings and pathology results discussed with patient    Follow up   Will follow up with her OB at [facility]  For the rest of the pregnancy           David Gary M.D.   OB/GYN

## 2019-06-26 ENCOUNTER — NURSE TRIAGE (OUTPATIENT)
Dept: ADMINISTRATIVE | Facility: CLINIC | Age: 34
End: 2019-06-26

## 2020-04-21 DIAGNOSIS — Z01.84 ANTIBODY RESPONSE EXAMINATION: ICD-10-CM

## 2020-05-21 DIAGNOSIS — Z01.84 ANTIBODY RESPONSE EXAMINATION: ICD-10-CM

## 2020-06-20 DIAGNOSIS — Z01.84 ANTIBODY RESPONSE EXAMINATION: ICD-10-CM

## 2020-07-20 DIAGNOSIS — Z01.84 ANTIBODY RESPONSE EXAMINATION: ICD-10-CM

## 2020-08-19 DIAGNOSIS — Z01.84 ANTIBODY RESPONSE EXAMINATION: ICD-10-CM

## 2020-09-18 DIAGNOSIS — Z01.84 ANTIBODY RESPONSE EXAMINATION: ICD-10-CM

## 2020-10-18 DIAGNOSIS — Z01.84 ANTIBODY RESPONSE EXAMINATION: ICD-10-CM

## 2020-11-17 DIAGNOSIS — Z01.84 ANTIBODY RESPONSE EXAMINATION: ICD-10-CM

## 2021-03-16 ENCOUNTER — HOSPITAL ENCOUNTER (EMERGENCY)
Facility: HOSPITAL | Age: 36
Discharge: HOME | End: 2021-03-17
Attending: EMERGENCY MEDICINE
Payer: MEDICAID

## 2021-03-16 VITALS
TEMPERATURE: 98 F | DIASTOLIC BLOOD PRESSURE: 86 MMHG | RESPIRATION RATE: 18 BRPM | HEIGHT: 64 IN | BODY MASS INDEX: 29.02 KG/M2 | HEART RATE: 92 BPM | OXYGEN SATURATION: 98 % | WEIGHT: 170 LBS | SYSTOLIC BLOOD PRESSURE: 135 MMHG

## 2021-03-16 DIAGNOSIS — K42.9 UMBILICAL HERNIA WITHOUT OBSTRUCTION AND WITHOUT GANGRENE: Primary | ICD-10-CM

## 2021-03-16 DIAGNOSIS — K43.9 VENTRAL HERNIA WITHOUT OBSTRUCTION OR GANGRENE: ICD-10-CM

## 2021-03-16 LAB
BUN SERPL-MCNC: 11 MG/DL (ref 6–30)
CHLORIDE SERPL-SCNC: 108 MMOL/L (ref 95–110)
CREAT SERPL-MCNC: 0.8 MG/DL (ref 0.5–1.4)
GLUCOSE SERPL-MCNC: 99 MG/DL (ref 70–110)
HCT VFR BLD CALC: 37 %PCV (ref 36–54)
POC IONIZED CALCIUM: 1.15 MMOL/L (ref 1.06–1.42)
POC TCO2 (MEASURED): 20 MMOL/L (ref 23–29)
POTASSIUM BLD-SCNC: 3.5 MMOL/L (ref 3.5–5.1)
SAMPLE: ABNORMAL
SODIUM BLD-SCNC: 139 MMOL/L (ref 136–145)

## 2021-03-16 PROCEDURE — 80047 BASIC METABLC PNL IONIZED CA: CPT

## 2021-03-16 PROCEDURE — 99285 EMERGENCY DEPT VISIT HI MDM: CPT | Mod: 25

## 2021-03-16 PROCEDURE — 99284 EMERGENCY DEPT VISIT MOD MDM: CPT | Mod: ,,, | Performed by: EMERGENCY MEDICINE

## 2021-03-16 PROCEDURE — 99284 PR EMERGENCY DEPT VISIT,LEVEL IV: ICD-10-PCS | Mod: ,,, | Performed by: EMERGENCY MEDICINE

## 2021-03-17 LAB
B-HCG UR QL: NEGATIVE
CTP QC/QA: YES

## 2021-03-17 PROCEDURE — 81025 URINE PREGNANCY TEST: CPT | Performed by: EMERGENCY MEDICINE

## 2021-03-17 PROCEDURE — 25500020 PHARM REV CODE 255: Performed by: EMERGENCY MEDICINE

## 2021-03-17 RX ADMIN — IOHEXOL 75 ML: 350 INJECTION, SOLUTION INTRAVENOUS at 01:03

## 2021-03-18 ENCOUNTER — TELEPHONE (OUTPATIENT)
Dept: EMERGENCY MEDICINE | Facility: HOSPITAL | Age: 36
End: 2021-03-18

## 2021-03-22 ENCOUNTER — TELEPHONE (OUTPATIENT)
Dept: SURGERY | Facility: CLINIC | Age: 36
End: 2021-03-22
Payer: MEDICAID

## 2021-05-04 ENCOUNTER — PATIENT MESSAGE (OUTPATIENT)
Dept: RESEARCH | Facility: HOSPITAL | Age: 36
End: 2021-05-04

## 2022-03-28 ENCOUNTER — PATIENT OUTREACH (OUTPATIENT)
Dept: EMERGENCY MEDICINE | Facility: HOSPITAL | Age: 37
End: 2022-03-28
Payer: MEDICAID

## 2022-03-28 NOTE — PROGRESS NOTES
Pricila Clarke  ED Navigator  Emergency Department    Project: [facility] ED Navigator  Role: Community Health Worker    Date: 03/28/2022  Patient Name: Janie Barbosa  MRN: [id number]  PCP: Renita Perry MD    Assessment:     Janie Barbosa is a 36 y.o. female who has presented to ED for sinus infection. Patient has visited the ED 1 times in the past 3 months. Patient did not contact PCP.     ED Navigator Initial Assessment    ED Navigator Enrollment Documentation  Consent to Services  Does patient consent to completing the assessment?: Yes  Contact  Method of Initial Contact: Phone  Transportation  Does the patient have issues with Transportation?: No  Does the patient have transportation to and from healthcare appointments?: Yes  Insurance Coverage  Do you have coverage/adequate coverage?: Yes  Type/kind of coverage: [facility]  Is patient able to afford co-pays/deductibles?: Yes  Is patient able to afford HME or supplies?: Yes  Does patient have an established [facility] PCP?: Yes  Able to access?: Yes  Does the patient have a lack of adequate coverage?: No  Specialist Appointment  Did the patient come to the ED to see a specialist?: No  Does the patient have a pending specialist referral?: No  Does the patient have a specialist appointment made?: No  PCP Follow Up Appointment  Has the patient had an appointment with a primary care provider in the past year?: Yes  Approximate date: 2/28/22  Provider:  (Comment: DR. PERRY)  Does the patient have a follow up appontment with a PCP?: No  When was the last time you saw your PCP?: 2/28/22  Why does the patient not have a follow up scheduled?: Long wait times  Medications  Is patient able to afford medication?: Yes  Is patient unable to get medication due to lack of transportation?: No  Psychological  Does the patient have psycho-social concerns?: No  Food  Does the patient have concerns about food?: No  Communication/Education  Does the patient  have limited English proficiency/English not primary language?: No  Does patient have low literacy and/or low health literacy?: No  Does patient have concerns with care?: No  Does patient have dissatisfaction with care?: No  Other Financial Concerns  Does the patient have immediate financial distress?: No  Does the patient have general financial concerns?: No  Other Social Barriers/Concerns  Does the patient have any additional barriers or concerns?: Work  Primary Barrier  Barriers identified: Structural barrier (service availability, waiting times, etc.)  Root Cause of ED Utilization: Lack of Access to Primary Care  Plan to address Lack of Access to Primary Care: Provided patient with information about the [facility] in their area, Provided [facility] PCP assistance line [phone], Provided information for [facility] ([facility] - [facility], [employer], etc.), Provided information for [facility] On Call 24/7 Nurse Triage line [phone] or 1-866-[facility] (1-284.411.3063), Provided information on COVID-19 resources and hotline (193-818-2729)  Next steps: Provided Education  Was education/educational materials provided surrounding PCP services/creating a medical home?: No    Was education/educational materials provided surrounding low cost, healthy foods?: No    Was education/educational materials provided surrounding other items? If so, use comment to explain.: No    Plan: Provided information for [facility] On Call 24/7 Nurse triage line, [phone] or 1-866-[facility] ([phone])  Expected Date of Follow Up 1: 4/11/22  Additional Documentation: Spoke with patient that presented to the ED for sinus infection. Patient stated she was feeling fine. Patient was asked if she had a PCP she stated she does and will schedule a follow up appointment. Patient denied needing any other assistance at this time.         Social History      Socioeconomic History    Marital status: Single   Tobacco Use    Smoking status: Never Smoker    Smokeless tobacco: Never Used   Substance and Sexual Activity    Alcohol use: No    Drug use: No    Sexual activity: Yes     Partners: Female, Male     Social Determinants of Health     Financial Resource Strain: Low Risk     Difficulty of Paying Living Expenses: Not very hard   Food Insecurity: No Food Insecurity    Worried About Running Out of Food in the Last Year: Never true    Ran Out of Food in the Last Year: Never true   Transportation Needs: No Transportation Needs    Lack of Transportation (Medical): No    Lack of Transportation (Non-Medical): No   Physical Activity: Sufficiently Active    Days of Exercise per Week: 7 days    Minutes of Exercise per Session: 150+ min   Stress: No Stress Concern Present    Feeling of Stress : Only a little   Social Connections: Socially Isolated    Frequency of Communication with Friends and Family: More than three times a week    Frequency of Social Gatherings with Friends and Family: More than three times a week    Attends Adventist Services: Never    Active Member of Clubs or Organizations: No    Attends Club or Organization Meetings: Never    Marital Status: Never    Housing Stability: Unknown    Unable to Pay for Housing in the Last Year: No    Unstable Housing in the Last Year: No       Plan:   Spoke with patient that presented to the ED for sinus infection. Patient stated she was feeling fine. Patient was asked if she had a PCP she stated she does and will schedule a follow up appointment. Patient denied needing any other assistance at this time.        Appointment made with: Renita Tom MD

## 2022-04-12 ENCOUNTER — PATIENT OUTREACH (OUTPATIENT)
Dept: EMERGENCY MEDICINE | Facility: HOSPITAL | Age: 37
End: 2022-04-12
Payer: MEDICAID

## 2022-04-12 NOTE — PROGRESS NOTES
Spoke with patient and she stated everything was fine. Patient was asked if she had been to see her PCP she stated she had been prior to the ED visit. Patient does not have a follow up scheduled at this time. Patient denied needing any other assistance at this time.

## 2022-05-02 ENCOUNTER — PATIENT OUTREACH (OUTPATIENT)
Dept: EMERGENCY MEDICINE | Facility: HOSPITAL | Age: 37
End: 2022-05-02
Payer: MEDICAID

## 2022-05-04 ENCOUNTER — PATIENT OUTREACH (OUTPATIENT)
Dept: EMERGENCY MEDICINE | Facility: HOSPITAL | Age: 37
End: 2022-05-04
Payer: MEDICAID

## 2022-05-06 ENCOUNTER — PATIENT OUTREACH (OUTPATIENT)
Dept: EMERGENCY MEDICINE | Facility: HOSPITAL | Age: 37
End: 2022-05-06
Payer: MEDICAID

## 2022-09-26 ENCOUNTER — TELEPHONE (OUTPATIENT)
Dept: OBSTETRICS AND GYNECOLOGY | Facility: CLINIC | Age: 37
End: 2022-09-26
Payer: COMMERCIAL

## 2023-01-03 ENCOUNTER — HOSPITAL ENCOUNTER (OUTPATIENT)
Dept: RADIOLOGY | Facility: HOSPITAL | Age: 38
Discharge: HOME | End: 2023-01-03
Attending: NURSE PRACTITIONER
Payer: COMMERCIAL

## 2023-01-03 ENCOUNTER — OFFICE VISIT (OUTPATIENT)
Dept: OBSTETRICS AND GYNECOLOGY | Facility: CLINIC | Age: 38
End: 2023-01-03
Payer: COMMERCIAL

## 2023-01-03 VITALS
BODY MASS INDEX: 34.97 KG/M2 | SYSTOLIC BLOOD PRESSURE: 106 MMHG | WEIGHT: 203.69 LBS | DIASTOLIC BLOOD PRESSURE: 74 MMHG

## 2023-01-03 DIAGNOSIS — Z11.3 SCREENING EXAMINATION FOR SEXUALLY TRANSMITTED DISEASE: ICD-10-CM

## 2023-01-03 DIAGNOSIS — Z12.4 ENCOUNTER FOR PAPANICOLAOU SMEAR FOR CERVICAL CANCER SCREENING: ICD-10-CM

## 2023-01-03 DIAGNOSIS — N92.0 MENORRHAGIA WITH REGULAR CYCLE: ICD-10-CM

## 2023-01-03 DIAGNOSIS — Z01.419 WELL WOMAN EXAM: Primary | ICD-10-CM

## 2023-01-03 PROCEDURE — 76856 US EXAM PELVIC COMPLETE: CPT | Mod: 26,,, | Performed by: RADIOLOGY

## 2023-01-03 PROCEDURE — 76830 US PELVIS COMP WITH TRANSVAG NON-OB (XPD): ICD-10-PCS | Mod: 26,,, | Performed by: RADIOLOGY

## 2023-01-03 PROCEDURE — 76830 TRANSVAGINAL US NON-OB: CPT | Mod: 26,,, | Performed by: RADIOLOGY

## 2023-01-03 PROCEDURE — 3008F PR BODY MASS INDEX (BMI) DOCUMENTED: ICD-10-PCS | Mod: CPTII,S$GLB,, | Performed by: NURSE PRACTITIONER

## 2023-01-03 PROCEDURE — 1160F RVW MEDS BY RX/DR IN RCRD: CPT | Mod: CPTII,S$GLB,, | Performed by: NURSE PRACTITIONER

## 2023-01-03 PROCEDURE — 87624 HPV HI-RISK TYP POOLED RSLT: CPT | Performed by: NURSE PRACTITIONER

## 2023-01-03 PROCEDURE — 87591 N.GONORRHOEAE DNA AMP PROB: CPT | Performed by: NURSE PRACTITIONER

## 2023-01-03 PROCEDURE — 99999 PR PBB SHADOW E&M-EST. PATIENT-LVL III: ICD-10-PCS | Mod: PBBFAC,,, | Performed by: NURSE PRACTITIONER

## 2023-01-03 PROCEDURE — 88175 CYTOPATH C/V AUTO FLUID REDO: CPT | Performed by: NURSE PRACTITIONER

## 2023-01-03 PROCEDURE — 3074F SYST BP LT 130 MM HG: CPT | Mod: CPTII,S$GLB,, | Performed by: NURSE PRACTITIONER

## 2023-01-03 PROCEDURE — 3008F BODY MASS INDEX DOCD: CPT | Mod: CPTII,S$GLB,, | Performed by: NURSE PRACTITIONER

## 2023-01-03 PROCEDURE — 99213 OFFICE O/P EST LOW 20 MIN: CPT | Mod: PBBFAC,PO,25 | Performed by: NURSE PRACTITIONER

## 2023-01-03 PROCEDURE — 3078F DIAST BP <80 MM HG: CPT | Mod: CPTII,S$GLB,, | Performed by: NURSE PRACTITIONER

## 2023-01-03 PROCEDURE — 76856 US PELVIS COMP WITH TRANSVAG NON-OB (XPD): ICD-10-PCS | Mod: 26,,, | Performed by: RADIOLOGY

## 2023-01-03 PROCEDURE — 87491 CHLMYD TRACH DNA AMP PROBE: CPT | Performed by: NURSE PRACTITIONER

## 2023-01-03 PROCEDURE — 81514 NFCT DS BV&VAGINITIS DNA ALG: CPT | Performed by: NURSE PRACTITIONER

## 2023-01-03 PROCEDURE — 1159F PR MEDICATION LIST DOCUMENTED IN MEDICAL RECORD: ICD-10-PCS | Mod: CPTII,S$GLB,, | Performed by: NURSE PRACTITIONER

## 2023-01-03 PROCEDURE — 76856 US EXAM PELVIC COMPLETE: CPT | Mod: TC,PO

## 2023-01-03 PROCEDURE — 3074F PR MOST RECENT SYSTOLIC BLOOD PRESSURE < 130 MM HG: ICD-10-PCS | Mod: CPTII,S$GLB,, | Performed by: NURSE PRACTITIONER

## 2023-01-03 PROCEDURE — 99385 PR PREVENTIVE VISIT,NEW,18-39: ICD-10-PCS | Mod: S$GLB,,, | Performed by: NURSE PRACTITIONER

## 2023-01-03 PROCEDURE — 99999 PR PBB SHADOW E&M-EST. PATIENT-LVL III: CPT | Mod: PBBFAC,,, | Performed by: NURSE PRACTITIONER

## 2023-01-03 PROCEDURE — 3078F PR MOST RECENT DIASTOLIC BLOOD PRESSURE < 80 MM HG: ICD-10-PCS | Mod: CPTII,S$GLB,, | Performed by: NURSE PRACTITIONER

## 2023-01-03 PROCEDURE — 1159F MED LIST DOCD IN RCRD: CPT | Mod: CPTII,S$GLB,, | Performed by: NURSE PRACTITIONER

## 2023-01-03 PROCEDURE — 1160F PR REVIEW ALL MEDS BY PRESCRIBER/CLIN PHARMACIST DOCUMENTED: ICD-10-PCS | Mod: CPTII,S$GLB,, | Performed by: NURSE PRACTITIONER

## 2023-01-03 PROCEDURE — 99385 PREV VISIT NEW AGE 18-39: CPT | Mod: S$GLB,,, | Performed by: NURSE PRACTITIONER

## 2023-01-03 NOTE — PROGRESS NOTES
CC: Annual    HPI: Pt is a 37 y.o. female who presents for routine annual exam.    States cycle has become heavier over the past 2 years, cycle are monthly, duration= 6 days, heavy for 2 days wears double protection, denies unmanageable pain. States she was being followed by GYN provider for this but he has retired, has not had pelvic u/s.    PAP:  normal per pt    Contraception: tubal ligation  STD screening- would like vaginal and blood testing   Exercise: no   Smoking history: no  Wears seatbelts    Denies domestic violence     History reviewed. No pertinent past medical history.    Past Surgical History:   Procedure Laterality Date     SECTION      CYST REMOVAL      DILATION AND CURETTAGE OF UTERUS      ECTOPIC PREGNANCY SURGERY      SALPINGECTOMY Right     SINUS SURGERY      TUBAL LIGATION         OB History    Para Term  AB Living   7 4 4   2 2   SAB IAB Ectopic Multiple Live Births           2      # Outcome Date GA Lbr Prosper/2nd Weight Sex Delivery Anes PTL Lv   7 Term 07    F CS-LTranv   DANDRE   6 Term 04    M CS-LTranv   DANDRE   5 Term            4             3 Term            2 AB            1 AB                Family History   Problem Relation Age of Onset    Breast cancer Neg Hx     Colon cancer Neg Hx     Ovarian cancer Neg Hx        Social History     Socioeconomic History    Marital status: Single   Tobacco Use    Smoking status: Never    Smokeless tobacco: Never   Substance and Sexual Activity    Alcohol use: No    Drug use: No    Sexual activity: Yes     Partners: Male     Birth control/protection: See Surgical Hx     Social Determinants of Health     Financial Resource Strain: Low Risk     Difficulty of Paying Living Expenses: Not very hard   Food Insecurity: No Food Insecurity    Worried About Running Out of Food in the Last Year: Never true    Ran Out of Food in the Last Year: Never true   Transportation Needs: No Transportation Needs    Lack of  Transportation (Medical): No    Lack of Transportation (Non-Medical): No   Physical Activity: Sufficiently Active    Days of Exercise per Week: 7 days    Minutes of Exercise per Session: 150+ min   Stress: No Stress Concern Present    Feeling of Stress : Only a little   Social Connections: Socially Isolated    Frequency of Communication with Friends and Family: More than three times a week    Frequency of Social Gatherings with Friends and Family: More than three times a week    Attends Restorationism Services: Never    Active Member of Clubs or Organizations: No    Attends Club or Organization Meetings: Never    Marital Status: Never    Housing Stability: Unknown    Unable to Pay for Housing in the Last Year: No    Unstable Housing in the Last Year: No       /74   Wt 92.4 kg (203 lb 11.3 oz)   LMP 12/27/2022   BMI 34.97 kg/m²       ROS:  GENERAL: Feeling well overall. Denies fever or chills.   SKIN: Denies rash or lesions.   HEAD: Denies head injury or headache.   NODES: Denies enlarged lymph nodes.   CHEST: Denies chest pain or shortness of breath.   CARDIOVASCULAR: Denies palpitations or left sided chest pain.   ABDOMEN: No abdominal pain, constipation, diarrhea, nausea, vomiting or rectal bleeding.   URINARY: No dysuria, hematuria, or burning on urination.  REPRODUCTIVE: See HPI.   BREASTS: Denies pain, lumps, or nipple discharge.   HEMATOLOGIC: No easy bruisability or excessive bleeding.   MUSCULOSKELETAL: Denies joint pain or swelling.   NEUROLOGIC: Denies syncope or weakness.   PSYCHIATRIC: Denies depression, anxiety or mood swings.    PE:   APPEARANCE: Well nourished, well developed, in no acute distress.  AFFECT: WNL, alert and oriented x 3  SKIN: No acne or hirsutism  NECK: Neck symmetric  NODES: No inguinal, cervical, axillary, or femoral lymph node enlargement  CHEST: Good respiratory effect  ABDOMEN: Soft.  No tenderness or masses. Nondistended.  BREASTS: Symmetrical, no skin changes or  visible lesions.  No palpable masses, nipple discharge bilaterally.  PELVIC: Normal external genitalia without lesions.  Normal hair distribution.  Adequate perineal body, normal urethral meatus.  Vagina moist and well rugated without lesions or discharge.  Cervix pink, without lesions, discharge or tenderness.  No significant cystocele or rectocele.  Bimanual exam shows uterus to be normal size, regular, mobile and nontender.  Adnexa without masses or tenderness.    Anus Perineum:No lesions, no relaxation, no external hemorrhoids.  EXTREMITIES: No edema.      ASSESSMENT AND PLAN  1. Well woman exam        2. Encounter for Papanicolaou smear for cervical cancer screening  HPV High Risk Genotypes, PCR    Liquid-Based Pap Smear, Screening      3. Menorrhagia with regular cycle  US Pelvis Comp with Transvag NON-OB (xpd    CBC Auto Differential    TSH      4. Screening examination for sexually transmitted disease  HIV 1/2 Ag/Ab (4th Gen)    RPR    Hepatitis Panel, Acute    C. trachomatis/N. gonorrhoeae by AMP DNA    Vaginosis Screen by DNA Probe        Discussed:  -Menorrhagia CBC, TSH, pelvic u/s ordered then f/u with MD provider.   -Encouraged increase in exercise 30min/day at least 3x week, with light weights/resistance  -Diet- The World Health Organization (WHO) recommendations for a healthy diet emphasize limiting the intake of saturated and trans fatty acids, free sugars, and salt, while increasing the intake of fruits, vegetables, legumes, nuts, and whole grains     Patient was counseled today on A.C.S. Pap guidelines and recommendations for yearly pelvic exams, mammograms and monthly self breast exams; to see her PCP for other health maintenance.     All questions answered, patient verbalizes understanding.     Follow-up with in 1 year for routine exam and PRN.

## 2023-01-05 ENCOUNTER — OFFICE VISIT (OUTPATIENT)
Dept: OBSTETRICS AND GYNECOLOGY | Facility: CLINIC | Age: 38
End: 2023-01-05
Payer: COMMERCIAL

## 2023-01-05 VITALS
WEIGHT: 203.25 LBS | BODY MASS INDEX: 34.89 KG/M2 | SYSTOLIC BLOOD PRESSURE: 119 MMHG | DIASTOLIC BLOOD PRESSURE: 78 MMHG

## 2023-01-05 DIAGNOSIS — D21.9 FIBROIDS: ICD-10-CM

## 2023-01-05 DIAGNOSIS — R30.0 DYSURIA: Primary | ICD-10-CM

## 2023-01-05 DIAGNOSIS — N93.9 ABNORMAL UTERINE BLEEDING (AUB): ICD-10-CM

## 2023-01-05 LAB
C TRACH DNA SPEC QL NAA+PROBE: NOT DETECTED
N GONORRHOEA DNA SPEC QL NAA+PROBE: NOT DETECTED

## 2023-01-05 PROCEDURE — 87086 URINE CULTURE/COLONY COUNT: CPT | Performed by: OBSTETRICS & GYNECOLOGY

## 2023-01-05 PROCEDURE — 99214 PR OFFICE/OUTPT VISIT, EST, LEVL IV, 30-39 MIN: ICD-10-PCS | Mod: S$GLB,,, | Performed by: OBSTETRICS & GYNECOLOGY

## 2023-01-05 PROCEDURE — 99999 PR PBB SHADOW E&M-EST. PATIENT-LVL III: ICD-10-PCS | Mod: PBBFAC,,, | Performed by: OBSTETRICS & GYNECOLOGY

## 2023-01-05 PROCEDURE — 1159F PR MEDICATION LIST DOCUMENTED IN MEDICAL RECORD: ICD-10-PCS | Mod: CPTII,S$GLB,, | Performed by: OBSTETRICS & GYNECOLOGY

## 2023-01-05 PROCEDURE — 3078F DIAST BP <80 MM HG: CPT | Mod: CPTII,S$GLB,, | Performed by: OBSTETRICS & GYNECOLOGY

## 2023-01-05 PROCEDURE — 87186 SC STD MICRODIL/AGAR DIL: CPT | Performed by: OBSTETRICS & GYNECOLOGY

## 2023-01-05 PROCEDURE — 1160F RVW MEDS BY RX/DR IN RCRD: CPT | Mod: CPTII,S$GLB,, | Performed by: OBSTETRICS & GYNECOLOGY

## 2023-01-05 PROCEDURE — 87077 CULTURE AEROBIC IDENTIFY: CPT | Performed by: OBSTETRICS & GYNECOLOGY

## 2023-01-05 PROCEDURE — 3008F BODY MASS INDEX DOCD: CPT | Mod: CPTII,S$GLB,, | Performed by: OBSTETRICS & GYNECOLOGY

## 2023-01-05 PROCEDURE — 3008F PR BODY MASS INDEX (BMI) DOCUMENTED: ICD-10-PCS | Mod: CPTII,S$GLB,, | Performed by: OBSTETRICS & GYNECOLOGY

## 2023-01-05 PROCEDURE — 99213 OFFICE O/P EST LOW 20 MIN: CPT | Mod: PBBFAC,PO | Performed by: OBSTETRICS & GYNECOLOGY

## 2023-01-05 PROCEDURE — 3074F PR MOST RECENT SYSTOLIC BLOOD PRESSURE < 130 MM HG: ICD-10-PCS | Mod: CPTII,S$GLB,, | Performed by: OBSTETRICS & GYNECOLOGY

## 2023-01-05 PROCEDURE — 1160F PR REVIEW ALL MEDS BY PRESCRIBER/CLIN PHARMACIST DOCUMENTED: ICD-10-PCS | Mod: CPTII,S$GLB,, | Performed by: OBSTETRICS & GYNECOLOGY

## 2023-01-05 PROCEDURE — 3074F SYST BP LT 130 MM HG: CPT | Mod: CPTII,S$GLB,, | Performed by: OBSTETRICS & GYNECOLOGY

## 2023-01-05 PROCEDURE — 99999 PR PBB SHADOW E&M-EST. PATIENT-LVL III: CPT | Mod: PBBFAC,,, | Performed by: OBSTETRICS & GYNECOLOGY

## 2023-01-05 PROCEDURE — 3078F PR MOST RECENT DIASTOLIC BLOOD PRESSURE < 80 MM HG: ICD-10-PCS | Mod: CPTII,S$GLB,, | Performed by: OBSTETRICS & GYNECOLOGY

## 2023-01-05 PROCEDURE — 87088 URINE BACTERIA CULTURE: CPT | Performed by: OBSTETRICS & GYNECOLOGY

## 2023-01-05 PROCEDURE — 1159F MED LIST DOCD IN RCRD: CPT | Mod: CPTII,S$GLB,, | Performed by: OBSTETRICS & GYNECOLOGY

## 2023-01-05 PROCEDURE — 99214 OFFICE O/P EST MOD 30 MIN: CPT | Mod: S$GLB,,, | Performed by: OBSTETRICS & GYNECOLOGY

## 2023-01-05 RX ORDER — MEDROXYPROGESTERONE ACETATE 10 MG/1
10 TABLET ORAL DAILY
Qty: 30 TABLET | Refills: 3 | Status: SHIPPED | OUTPATIENT
Start: 2023-01-05 | End: 2023-01-05 | Stop reason: ALTCHOICE

## 2023-01-05 RX ORDER — TERCONAZOLE 4 MG/G
1 CREAM VAGINAL NIGHTLY
Qty: 7 G | Refills: 0 | Status: ON HOLD | OUTPATIENT
Start: 2023-01-05 | End: 2023-12-22 | Stop reason: HOSPADM

## 2023-01-05 RX ORDER — ETODOLAC 400 MG/1
400 TABLET, EXTENDED RELEASE ORAL DAILY PRN
COMMUNITY
Start: 2022-03-07 | End: 2024-01-31

## 2023-01-05 RX ORDER — MEDROXYPROGESTERONE ACETATE 10 MG/1
10 TABLET ORAL DAILY
Qty: 30 TABLET | Refills: 3 | Status: SHIPPED | OUTPATIENT
Start: 2023-01-05 | End: 2024-01-31

## 2023-01-05 NOTE — PROGRESS NOTES
GYNECOLOGY  :  Janie Barbosa is a 37 y.o.    Here today for  gyn evaluation     HPI:  Complaints of  heavy menstrual cycles over the last 6 months   Bleeds monthly but heavy with clots and some cramping     Saw nurse practitioner 3 days ago   Ultrasound was ordered :  FINDINGS:  Uterus is enlarged measuring 10.9 x 4.1 x 5.1 cm.  The endometrial thickness is 6 mm.  Nine and 15 mm intramural fibroids are seen.     Right ovary is is surgically absent.    Left ovary is normal and measures 3 x 1.9 x 3.1 cm. 1.9 cm follicle or cyst seen within the left ovary.  Flow is demonstrated within the left ovary.       Menstrual cycles :  Sexually active yes  (x)  no (-)  Hx Abnormal PAPs yes(  )   No ( x)   Hx STD  =yes (  )   no (x)  Birth control = BTL    Last visit to GYN = for pregnancy/delivery    History reviewed. No pertinent past medical history.  Past Surgical History:   Procedure Laterality Date     SECTION      CYST REMOVAL      DILATION AND CURETTAGE OF UTERUS      ECTOPIC PREGNANCY SURGERY      SALPINGECTOMY Right     SINUS SURGERY      TUBAL LIGATION       Family History   Problem Relation Age of Onset    Breast cancer Neg Hx     Colon cancer Neg Hx     Ovarian cancer Neg Hx      Social History     Tobacco Use    Smoking status: Never    Smokeless tobacco: Never   Substance Use Topics    Alcohol use: No    Drug use: No     OB History    Para Term  AB Living   6 4 4   2 4   SAB IAB Ectopic Multiple Live Births           4      # Outcome Date GA Lbr Prosper/2nd Weight Sex Delivery Anes PTL Lv   6 Term 07    F CS-LTranv   DANDRE   5 Term 04    M CS-LTranv   DANDRE   4 Term      CS-Unspec   DANDRE   3 Term      CS-Unspec   DANDRE   2 AB            1 AB                /78   Wt 92.2 kg (203 lb 4.2 oz)   LMP 2022 (Approximate)   BMI 34.89 kg/m²     Last PAP=   LMP =   Last Mammogram = -  Last Colonoscopy  =-      COMPREHENSIVE GYN HISTORY:  G 6 P 4     PAP History:  Denies abnormal Paps.  Infection History: Denies STDs. Denies PID.  Benign History: Denies uterine fibroids. Denies ovarian cysts. Denies endometriosis.   Cancer History: Denies cervical cancer. Denies uterine cancer or hyperplasia. Denies ovarian cancer. Denies vulvar cancer or pre-cancer. Denies vaginal cancer or pre-cancer. Denies breast cancer. Denies colon cancer.  Sexual Activity History: ( x ) Yes   ( - )   no   Menstrual History: Age of menarche: ( 15  )  years. Every (  30 )       days, flows for (  8 )   days. heavy flow.  Dysmenorrhea History:  absent  Contraception:  BTL    ROS  GENERAL: Denies significant weight gain or weight loss. Feeling well overall.  SKIN: Denies rash or lesions.  Normal skin turgor  HEAD: Denies head injury or headache.   NODES: Denies enlarged lymph nodes.   CHEST: Denies chest pain or shortness of breath.   CARDIOVASCULAR: Denies palpitations or left sided chest pain.   ABDOMEN: No abdominal pain,no  diarrhea,constipation  nausea, vomiting or rectal bleeding.   URINARY: normal  Frequency,no  Dysuria or burning on urination.   REPRODUCTIVE: Per HPI   BREASTS: The patient sometimes performs breast self-examination and denies pain, lumps, or nipple discharge.   HEMATOLOGIC: No easy bruisability or excessive bleeding.   MUSCULOSKELETAL: Denies joint pain or swelling.   NEUROLOGIC: Denies syncope or weakness.   PSYCHIATRIC: Denies depression, anxiety or mood swings.    Physical Exam     Constitutional: She is oriented to person, place, and time. She appears well-developed and well-nourished. No distress.   HENT:   Head: Normocephalic.   NECK: Neck symmetric without masses or thyromegaly.  Cardiovascular: Normal rate and regular rhythm.   Pulmonary/Chest: Effort normal and breath sounds normal. No respiratory distress. She has no wheezes.   Breasts: Symmetrical, no skin changes or visible lesions. No palpable masses, nipple discharge or adenopathy bilaterally.  Abdominal: Soft not  distended. Bowel sounds are normal. She exhibits   no masses . No tenderness to palpation.   Genitourinary: Pelvic exam was performed with patient supine.   External genitalia normal no lesions.Normal hair distribution   Adequate perineal body,normal urethral meatus   Vagina moist and well rugated without lesions, no vaginal  Discharge.   Cervix pink and without lesions. No bleeding. No significant cystocele or rectocele.  Bimanual exam showed uterus normal size, shape and position , mobile and nontender. Adnexa without masses or tenderness. Urethra and bladder normal  Extremities normal no cyanosis ,edema.         A/P Anabellel Barbosa 37 y.o.     Dx=  1- AUB  2-uterine fibroids   3-    Procedures/Orders:    Assessment /Plan:  Patiens is counseled in detail about  uterine fibroids and abnormal uterine bleeding   Patient was counseled on management options   including observation   (expectant management) , medical treatment  and surgical alternatives (endometrial ablation and hysterectomy )  were discussed as well .      .   General information given about contributing risk factors, natural history of the condition and possible consequences and complications  .  Treatment  /management options also discussed.   Will start progesterone daily for 3 -6 months and reevaluate response  Patient not interested in Hysteroscopy / endometrial ablation   Desires medical management     Patient was counseled today on A.C.S. Pap guidelines and recommendations for yearly pelvic exams, mammograms and monthly self breast exams; to see her PCP for other health maintenance, nutrition and weight gain counseling, discussed lab values.  Discussed colonoscopy recommendations every 10 years starting at age 50   Calcium and vit D daily intake     F/u in 4 months or PRN       I spent a total of 30 minutes on the day of the visit.This includes face to face time and non-face to face time preparing to see the patient (eg, review of tests),  Obtaining and/or reviewing separately obtained history, Documenting clinical information in the electronic or other health record, Independently interpreting resultsand communicating results to the patient/family/caregiver, or Care coordination.      David Gary M.D.   OB/GYN

## 2023-01-06 DIAGNOSIS — N76.0 BACTERIAL VAGINOSIS: Primary | ICD-10-CM

## 2023-01-06 DIAGNOSIS — B96.89 BACTERIAL VAGINOSIS: Primary | ICD-10-CM

## 2023-01-06 LAB
BACTERIAL VAGINOSIS DNA: POSITIVE
CANDIDA GLABRATA DNA: NEGATIVE
CANDIDA KRUSEI DNA: NEGATIVE
CANDIDA RRNA VAG QL PROBE: NEGATIVE
T VAGINALIS RRNA GENITAL QL PROBE: NEGATIVE

## 2023-01-06 RX ORDER — TINIDAZOLE 500 MG/1
2 TABLET ORAL DAILY
Qty: 8 TABLET | Refills: 1 | Status: SHIPPED | OUTPATIENT
Start: 2023-01-06 | End: 2023-01-08

## 2023-01-08 ENCOUNTER — TELEPHONE (OUTPATIENT)
Dept: OBSTETRICS AND GYNECOLOGY | Facility: HOSPITAL | Age: 38
End: 2023-01-08
Payer: COMMERCIAL

## 2023-01-08 LAB — BACTERIA UR CULT: ABNORMAL

## 2023-01-08 RX ORDER — CIPROFLOXACIN 250 MG/1
250 TABLET, FILM COATED ORAL 2 TIMES DAILY
Qty: 6 TABLET | Refills: 0 | Status: SHIPPED | OUTPATIENT
Start: 2023-01-08 | End: 2023-01-11

## 2023-01-09 LAB
FINAL PATHOLOGIC DIAGNOSIS: NORMAL
Lab: NORMAL

## 2023-01-09 NOTE — TELEPHONE ENCOUNTER
Urine culture result was reviewed:    Positive   DX=> UTI     Rx for antibiotic sent to pharmacy  Please inform patient.   Rose Mary Gary M.D.   OB/GYN    1/8/2023

## 2023-01-11 LAB
HPV HR 12 DNA SPEC QL NAA+PROBE: NEGATIVE
HPV16 AG SPEC QL: NEGATIVE
HPV18 DNA SPEC QL NAA+PROBE: NEGATIVE

## 2023-04-27 ENCOUNTER — TELEPHONE (OUTPATIENT)
Dept: OBSTETRICS AND GYNECOLOGY | Facility: CLINIC | Age: 38
End: 2023-04-27

## 2023-04-27 ENCOUNTER — OFFICE VISIT (OUTPATIENT)
Dept: OBSTETRICS AND GYNECOLOGY | Facility: CLINIC | Age: 38
End: 2023-04-27
Payer: MEDICAID

## 2023-04-27 VITALS
DIASTOLIC BLOOD PRESSURE: 84 MMHG | HEIGHT: 64 IN | SYSTOLIC BLOOD PRESSURE: 126 MMHG | BODY MASS INDEX: 35.19 KG/M2 | WEIGHT: 206.13 LBS | HEART RATE: 103 BPM

## 2023-04-27 DIAGNOSIS — D25.1 FIBROIDS, INTRAMURAL: ICD-10-CM

## 2023-04-27 DIAGNOSIS — N76.0 VULVOVAGINITIS: Primary | ICD-10-CM

## 2023-04-27 LAB
BACTERIAL VAGINOSIS DNA: POSITIVE
CANDIDA GLABRATA DNA: NEGATIVE
CANDIDA KRUSEI DNA: NEGATIVE
CANDIDA RRNA VAG QL PROBE: POSITIVE
T VAGINALIS RRNA GENITAL QL PROBE: NEGATIVE

## 2023-04-27 PROCEDURE — 3074F PR MOST RECENT SYSTOLIC BLOOD PRESSURE < 130 MM HG: ICD-10-PCS | Mod: CPTII,,, | Performed by: OBSTETRICS & GYNECOLOGY

## 2023-04-27 PROCEDURE — 99999 PR PBB SHADOW E&M-EST. PATIENT-LVL III: CPT | Mod: PBBFAC,,, | Performed by: OBSTETRICS & GYNECOLOGY

## 2023-04-27 PROCEDURE — 81514 NFCT DS BV&VAGINITIS DNA ALG: CPT | Performed by: OBSTETRICS & GYNECOLOGY

## 2023-04-27 PROCEDURE — 99213 OFFICE O/P EST LOW 20 MIN: CPT | Mod: S$PBB,,, | Performed by: OBSTETRICS & GYNECOLOGY

## 2023-04-27 PROCEDURE — 87591 N.GONORRHOEAE DNA AMP PROB: CPT | Performed by: OBSTETRICS & GYNECOLOGY

## 2023-04-27 PROCEDURE — 3074F SYST BP LT 130 MM HG: CPT | Mod: CPTII,,, | Performed by: OBSTETRICS & GYNECOLOGY

## 2023-04-27 PROCEDURE — 3008F BODY MASS INDEX DOCD: CPT | Mod: CPTII,,, | Performed by: OBSTETRICS & GYNECOLOGY

## 2023-04-27 PROCEDURE — 99999 PR PBB SHADOW E&M-EST. PATIENT-LVL III: ICD-10-PCS | Mod: PBBFAC,,, | Performed by: OBSTETRICS & GYNECOLOGY

## 2023-04-27 PROCEDURE — 1159F MED LIST DOCD IN RCRD: CPT | Mod: CPTII,,, | Performed by: OBSTETRICS & GYNECOLOGY

## 2023-04-27 PROCEDURE — 1159F PR MEDICATION LIST DOCUMENTED IN MEDICAL RECORD: ICD-10-PCS | Mod: CPTII,,, | Performed by: OBSTETRICS & GYNECOLOGY

## 2023-04-27 PROCEDURE — 3079F DIAST BP 80-89 MM HG: CPT | Mod: CPTII,,, | Performed by: OBSTETRICS & GYNECOLOGY

## 2023-04-27 PROCEDURE — 3079F PR MOST RECENT DIASTOLIC BLOOD PRESSURE 80-89 MM HG: ICD-10-PCS | Mod: CPTII,,, | Performed by: OBSTETRICS & GYNECOLOGY

## 2023-04-27 PROCEDURE — 99213 PR OFFICE/OUTPT VISIT, EST, LEVL III, 20-29 MIN: ICD-10-PCS | Mod: S$PBB,,, | Performed by: OBSTETRICS & GYNECOLOGY

## 2023-04-27 PROCEDURE — 3008F PR BODY MASS INDEX (BMI) DOCUMENTED: ICD-10-PCS | Mod: CPTII,,, | Performed by: OBSTETRICS & GYNECOLOGY

## 2023-04-27 PROCEDURE — 99213 OFFICE O/P EST LOW 20 MIN: CPT | Mod: PBBFAC,PO | Performed by: OBSTETRICS & GYNECOLOGY

## 2023-04-27 RX ORDER — CLOTRIMAZOLE AND BETAMETHASONE DIPROPIONATE 10; .64 MG/G; MG/G
CREAM TOPICAL 2 TIMES DAILY
Qty: 15 G | Refills: 1 | Status: SHIPPED | OUTPATIENT
Start: 2023-04-27 | End: 2024-01-31

## 2023-04-27 RX ORDER — TERCONAZOLE 4 MG/G
1 CREAM VAGINAL NIGHTLY
Qty: 7 G | Refills: 0 | Status: ON HOLD | OUTPATIENT
Start: 2023-04-27 | End: 2023-12-22 | Stop reason: HOSPADM

## 2023-04-27 NOTE — TELEPHONE ENCOUNTER
Called pt to answer any concerns, pt stated that she didn't know if medication was sent to pharmacy

## 2023-04-27 NOTE — PROGRESS NOTES
GYNECOLOGY  :  Janie Barbosa is a 37 y.o.    Here today for  gyn evaluation   Discomfort, and pressure on vaginal area  Started to use tampons recently, thinks might have contributed to it   Normal menstrual cycles now, no concerns on that regard     On 2023 :  Complaints of  heavy menstrual cycles over the last 6 months   Bleeds monthly but heavy with clots and some cramping     Saw nurse practitioner 3 days ago   Ultrasound was ordered :  FINDINGS:  Uterus is enlarged measuring 10.9 x 4.1 x 5.1 cm.  The endometrial thickness is 6 mm.  Nine and 15 mm intramural fibroids are seen.     Right ovary is is surgically absent.    Left ovary is normal and measures 3 x 1.9 x 3.1 cm. 1.9 cm follicle or cyst seen within the left ovary.  Flow is demonstrated within the left ovary.       Menstrual cycles :  Sexually active yes  (x)  no (-)  Hx Abnormal PAPs yes(  )   No ( x)   Hx STD  =yes (  )   no (x)  Birth control = BTL    Last visit to GYN = for pregnancy/delivery    History reviewed. No pertinent past medical history.  Past Surgical History:   Procedure Laterality Date     SECTION      CYST REMOVAL      DILATION AND CURETTAGE OF UTERUS      ECTOPIC PREGNANCY SURGERY      SALPINGECTOMY Right     SINUS SURGERY      TUBAL LIGATION       Family History   Problem Relation Age of Onset    Breast cancer Neg Hx     Colon cancer Neg Hx     Ovarian cancer Neg Hx      Social History     Tobacco Use    Smoking status: Never    Smokeless tobacco: Never   Substance Use Topics    Alcohol use: No    Drug use: No     OB History    Para Term  AB Living   6 4 4   2 4   SAB IAB Ectopic Multiple Live Births           4      # Outcome Date GA Lbr Prosper/2nd Weight Sex Delivery Anes PTL Lv   6 Term 07    F CS-LTranv   DANDRE   5 Term 04    M CS-LTranv   DANDRE   4 Term      CS-Unspec   DANDRE   3 Term      CS-Unspec   DANDRE   2 AB            1 AB                /84 (BP Location: Right arm,  "Patient Position: Sitting)   Pulse 103   Ht 5' 4" (1.626 m)   Wt 93.5 kg (206 lb 2.1 oz)   LMP 04/22/2023 (Approximate) Comment: light bleeding, few days  BMI 35.38 kg/m²     Last PAP= 2022  LMP = 1/23  Last Mammogram = -  Last Colonoscopy  =-      COMPREHENSIVE GYN HISTORY:  G 6 P 4     PAP History: Denies abnormal Paps.  Infection History: Denies STDs. Denies PID.  Benign History: Denies uterine fibroids. Denies ovarian cysts. Denies endometriosis.   Cancer History: Denies cervical cancer. Denies uterine cancer or hyperplasia. Denies ovarian cancer. Denies vulvar cancer or pre-cancer. Denies vaginal cancer or pre-cancer. Denies breast cancer. Denies colon cancer.  Sexual Activity History: ( x ) Yes   ( - )   no   Menstrual History: Age of menarche: ( 15  )  years. Every (  30 )       days, flows for (  8 )   days. heavy flow.  Dysmenorrhea History:  absent  Contraception:  BTL    ROS  GENERAL: Denies significant weight gain or weight loss. Feeling well overall.  SKIN: Denies rash or lesions.  Normal skin turgor  HEAD: Denies head injury or headache.   NODES: Denies enlarged lymph nodes.   CHEST: Denies chest pain or shortness of breath.   CARDIOVASCULAR: Denies palpitations or left sided chest pain.   ABDOMEN: No abdominal pain,no  diarrhea,constipation  nausea, vomiting or rectal bleeding.   URINARY: normal  Frequency,no  Dysuria or burning on urination.   REPRODUCTIVE: Per HPI   BREASTS: The patient sometimes performs breast self-examination and denies pain, lumps, or nipple discharge.   HEMATOLOGIC: No easy bruisability or excessive bleeding.   MUSCULOSKELETAL: Denies joint pain or swelling.   NEUROLOGIC: Denies syncope or weakness.   PSYCHIATRIC: Denies depression, anxiety or mood swings.    Physical Exam     Constitutional: She is oriented to person, place, and time. She appears well-developed and well-nourished. No distress.   HENT:   Head: Normocephalic.   NECK: Neck symmetric without masses or " thyromegaly.  Cardiovascular: Normal rate and regular rhythm.   Pulmonary/Chest: Effort normal and breath sounds normal. No respiratory distress. She has no wheezes.   Breasts: Symmetrical, no skin changes or visible lesions. No palpable masses, nipple discharge or adenopathy bilaterally.  Abdominal: Soft not distended. Bowel sounds are normal. She exhibits   no masses . No tenderness to palpation.   Genitourinary: Pelvic exam was performed with patient supine.   External genitalia normal no lesions.Normal hair distribution   Adequate perineal body,normal urethral meatus   Vagina moist and well rugated without lesions, whitish  vaginal  Discharge.   Cervix pink and without lesions. No bleeding. No significant cystocele or rectocele.  Bimanual exam showed uterus normal size, shape and position , mobile and nontender. Adnexa without masses or tenderness. Urethra and bladder normal  Extremities normal no cyanosis ,edema.         A/P Janie Barbosa 37 y.o.     Dx=  1-vulvovaginitis   2-uterine fibroids   3-    Procedures/Orders:    Assessment /Plan:  Vulvar care  general recommendations  Terazol/lotrisone       I spent a total of 30 minutes on the day of the visit.This includes face to face time and non-face to face time preparing to see the patient (eg, review of tests), Obtaining and/or reviewing separately obtained history, Documenting clinical information in the electronic or other health record, Independently interpreting resultsand communicating results to the patient/family/caregiver, or Care coordination.      David Gary M.D.   OB/GYN

## 2023-04-27 NOTE — TELEPHONE ENCOUNTER
----- Message from Jordan Mcnulty sent at 4/27/2023 12:56 PM CDT -----  Contact: pt  Type: Requesting to speak with nurse        Who Called: PT  Regarding: prescriptions that was to be sent to pharmacy today. Would like to speak to nurse or ma.  Would the patient rather a call back or a response via [facility]? Call back  Best Call Back Number: [phone]  Additional Information:

## 2023-04-28 ENCOUNTER — TELEPHONE (OUTPATIENT)
Dept: OBSTETRICS AND GYNECOLOGY | Facility: CLINIC | Age: 38
End: 2023-04-28

## 2023-04-28 RX ORDER — TERCONAZOLE 4 MG/G
1 CREAM VAGINAL NIGHTLY
Qty: 7 G | Refills: 0 | Status: ON HOLD | OUTPATIENT
Start: 2023-04-28 | End: 2023-12-22 | Stop reason: HOSPADM

## 2023-04-28 RX ORDER — METRONIDAZOLE 500 MG/1
500 TABLET ORAL EVERY 12 HOURS
Qty: 14 TABLET | Refills: 0 | Status: SHIPPED | OUTPATIENT
Start: 2023-04-28 | End: 2023-05-05

## 2023-04-28 NOTE — TELEPHONE ENCOUNTER
Spoke to pt and scheduled gcct retest due to swab being collected in correctly yesterday-pt will come in next week but is requesting rx for +bv results. Please send rx to [facility] on file because she would like to start treatment.

## 2023-04-28 NOTE — TELEPHONE ENCOUNTER
----- Message from Mary Miranda sent at 4/28/2023  2:28 AM CDT -----  Regarding: Lab Client Services  Contact: [phone]  Good Morning,     My name is Mary Miranda I work in the Lab Client Services. We had a problem with some lab work on this patient. If someone from your office could call us at [phone] or ext. [phone] that would be great. Anyone in my department can help.      Thank you

## 2023-04-28 NOTE — TELEPHONE ENCOUNTER
Returned lab call and was informed that gcct swab was collected in wrong tube. Pt and Dr will be notified.

## 2023-04-28 NOTE — TELEPHONE ENCOUNTER
Affirm resulted  Positive for BV and Yeast .  Rx sent to pharmacy on file  Please inform patient    David Gary M.D.   OB/GYN

## 2023-05-02 ENCOUNTER — PATIENT MESSAGE (OUTPATIENT)
Dept: OBSTETRICS AND GYNECOLOGY | Facility: CLINIC | Age: 38
End: 2023-05-02

## 2023-05-02 ENCOUNTER — TELEPHONE (OUTPATIENT)
Dept: OBSTETRICS AND GYNECOLOGY | Facility: CLINIC | Age: 38
End: 2023-05-02

## 2023-05-02 NOTE — TELEPHONE ENCOUNTER
----- Message from Mary Miranda sent at 5/2/2023 12:35 AM CDT -----  Regarding: Lab Client Services  Contact: [phone]  Good Morning,     My name is Mary Miranda I work in the Lab Client Services. We had a problem with some lab work on this patient. If someone from your office could call us at [phone] or ext. [phone] that would be great. Anyone in my department can help.      Thank you

## 2023-05-04 ENCOUNTER — TELEPHONE (OUTPATIENT)
Dept: OBSTETRICS AND GYNECOLOGY | Facility: CLINIC | Age: 38
End: 2023-05-04

## 2023-05-04 NOTE — TELEPHONE ENCOUNTER
Spoke to pt and rs today's appt due to menstrual coming down yesterday and once she comes in will also discuss surgery options for aub.

## 2023-05-04 NOTE — TELEPHONE ENCOUNTER
----- Message from Virginia Mejia sent at 5/4/2023 12:02 PM CDT -----  Type:  Needs Medical Advice    Who Called: pt  Symptoms (please be specific): pt is requesting to get a return call  form the nurse  in regards to the appointment that is scheduled for 05/04/23  #:    Would the patient rather a call back or a response via [facility]? call  Best Call Back Number: [phone]   Additional Information:

## 2023-05-18 ENCOUNTER — OFFICE VISIT (OUTPATIENT)
Dept: OBSTETRICS AND GYNECOLOGY | Facility: CLINIC | Age: 38
End: 2023-05-18
Payer: MEDICAID

## 2023-05-18 DIAGNOSIS — N39.0 URINARY TRACT INFECTION WITHOUT HEMATURIA, SITE UNSPECIFIED: ICD-10-CM

## 2023-05-18 DIAGNOSIS — N93.9 ABNORMAL UTERINE BLEEDING (AUB): Primary | ICD-10-CM

## 2023-05-18 DIAGNOSIS — N30.00 ACUTE CYSTITIS WITHOUT HEMATURIA: ICD-10-CM

## 2023-05-18 PROCEDURE — 87077 CULTURE AEROBIC IDENTIFY: CPT | Performed by: OBSTETRICS & GYNECOLOGY

## 2023-05-18 PROCEDURE — 1160F RVW MEDS BY RX/DR IN RCRD: CPT | Mod: CPTII,,, | Performed by: OBSTETRICS & GYNECOLOGY

## 2023-05-18 PROCEDURE — 1159F PR MEDICATION LIST DOCUMENTED IN MEDICAL RECORD: ICD-10-PCS | Mod: CPTII,,, | Performed by: OBSTETRICS & GYNECOLOGY

## 2023-05-18 PROCEDURE — 99999 PR PBB SHADOW E&M-EST. PATIENT-LVL II: ICD-10-PCS | Mod: PBBFAC,,, | Performed by: OBSTETRICS & GYNECOLOGY

## 2023-05-18 PROCEDURE — 99214 PR OFFICE/OUTPT VISIT, EST, LEVL IV, 30-39 MIN: ICD-10-PCS | Mod: S$PBB,,, | Performed by: OBSTETRICS & GYNECOLOGY

## 2023-05-18 PROCEDURE — 99212 OFFICE O/P EST SF 10 MIN: CPT | Mod: PBBFAC,PO | Performed by: OBSTETRICS & GYNECOLOGY

## 2023-05-18 PROCEDURE — 99214 OFFICE O/P EST MOD 30 MIN: CPT | Mod: S$PBB,,, | Performed by: OBSTETRICS & GYNECOLOGY

## 2023-05-18 PROCEDURE — 87186 SC STD MICRODIL/AGAR DIL: CPT | Performed by: OBSTETRICS & GYNECOLOGY

## 2023-05-18 PROCEDURE — 87086 URINE CULTURE/COLONY COUNT: CPT | Performed by: OBSTETRICS & GYNECOLOGY

## 2023-05-18 PROCEDURE — 87088 URINE BACTERIA CULTURE: CPT | Performed by: OBSTETRICS & GYNECOLOGY

## 2023-05-18 PROCEDURE — 87591 N.GONORRHOEAE DNA AMP PROB: CPT | Performed by: OBSTETRICS & GYNECOLOGY

## 2023-05-18 PROCEDURE — 99999 PR PBB SHADOW E&M-EST. PATIENT-LVL II: CPT | Mod: PBBFAC,,, | Performed by: OBSTETRICS & GYNECOLOGY

## 2023-05-18 PROCEDURE — 1160F PR REVIEW ALL MEDS BY PRESCRIBER/CLIN PHARMACIST DOCUMENTED: ICD-10-PCS | Mod: CPTII,,, | Performed by: OBSTETRICS & GYNECOLOGY

## 2023-05-18 PROCEDURE — 1159F MED LIST DOCD IN RCRD: CPT | Mod: CPTII,,, | Performed by: OBSTETRICS & GYNECOLOGY

## 2023-05-18 RX ORDER — CIPROFLOXACIN 250 MG/1
250 TABLET, FILM COATED ORAL 2 TIMES DAILY
Qty: 6 TABLET | Refills: 0 | Status: SHIPPED | OUTPATIENT
Start: 2023-05-18 | End: 2023-05-21

## 2023-05-18 NOTE — PROGRESS NOTES
GYNECOLOGY  :  Janie Barbosa is a 37 y.o.    Here today for  follow up   Seen in 2023 for AUB and uterine fibroids       Complaints of  heavy menstrual cycles over the last 6 months   Bleeds monthly but heavy with clots and some cramping     Saw nurse practitioner 3 days ago   Ultrasound was ordered :  FINDINGS:  Uterus is enlarged measuring 10.9 x 4.1 x 5.1 cm.  The endometrial thickness is 6 mm.  Nine and 15 mm intramural fibroids are seen.     Right ovary is is surgically absent.    Left ovary is normal and measures 3 x 1.9 x 3.1 cm. 1.9 cm follicle or cyst seen within the left ovary.  Flow is demonstrated within the left ovary.       Menstrual cycles :  Sexually active yes  (x)  no (-)  Hx Abnormal PAPs yes(  )   No ( x)   Hx STD  =yes (  )   no (x)  Birth control = BTL    Last visit to GYN = for pregnancy/delivery    History reviewed. No pertinent past medical history.  Past Surgical History:   Procedure Laterality Date     SECTION      CYST REMOVAL      DILATION AND CURETTAGE OF UTERUS      ECTOPIC PREGNANCY SURGERY      SALPINGECTOMY Right     SINUS SURGERY      TUBAL LIGATION       Family History   Problem Relation Age of Onset    Breast cancer Neg Hx     Colon cancer Neg Hx     Ovarian cancer Neg Hx      Social History     Tobacco Use    Smoking status: Never    Smokeless tobacco: Never   Substance Use Topics    Alcohol use: No    Drug use: No     OB History    Para Term  AB Living   6 4 4   2 4   SAB IAB Ectopic Multiple Live Births           4      # Outcome Date GA Lbr Prosper/2nd Weight Sex Delivery Anes PTL Lv   6 Term 07    F CS-LTranv   DANDRE   5 Term 04    M CS-LTranv   DANDRE   4 Term      CS-Unspec   DANDRE   3 Term      CS-Unspec   DANDRE   2 AB            1 AB                LMP 2023 (Approximate) Comment: light bleeding, few days    Last PAP=   LMP =   Last Mammogram = -  Last Colonoscopy  =-      COMPREHENSIVE GYN HISTORY:  G 6 P 4     PAP  History: Denies abnormal Paps.  Infection History: Denies STDs. Denies PID.  Benign History: Denies uterine fibroids. Denies ovarian cysts. Denies endometriosis.   Cancer History: Denies cervical cancer. Denies uterine cancer or hyperplasia. Denies ovarian cancer. Denies vulvar cancer or pre-cancer. Denies vaginal cancer or pre-cancer. Denies breast cancer. Denies colon cancer.  Sexual Activity History: ( x ) Yes   ( - )   no   Menstrual History: Age of menarche: ( 15  )  years. Every (  30 )       days, flows for (  8 )   days. heavy flow.  Dysmenorrhea History:  absent  Contraception:  BTL    ROS  GENERAL: Denies significant weight gain or weight loss. Feeling well overall.  SKIN: Denies rash or lesions.  Normal skin turgor  HEAD: Denies head injury or headache.   NODES: Denies enlarged lymph nodes.   CHEST: Denies chest pain or shortness of breath.   CARDIOVASCULAR: Denies palpitations or left sided chest pain.   ABDOMEN: No abdominal pain,no  diarrhea,constipation  nausea, vomiting or rectal bleeding.   URINARY: normal  Frequency,no  Dysuria or burning on urination.   REPRODUCTIVE: Per HPI   BREASTS: The patient sometimes performs breast self-examination and denies pain, lumps, or nipple discharge.   HEMATOLOGIC: No easy bruisability or excessive bleeding.   MUSCULOSKELETAL: Denies joint pain or swelling.   NEUROLOGIC: Denies syncope or weakness.   PSYCHIATRIC: Denies depression, anxiety or mood swings.    Physical Exam     Constitutional: She is oriented to person, place, and time. She appears well-developed and well-nourished. No distress.   HENT:   Head: Normocephalic.   NECK: Neck symmetric without masses or thyromegaly.  Cardiovascular: Normal rate and regular rhythm.   Pulmonary/Chest: Effort normal and breath sounds normal. No respiratory distress. She has no wheezes.   Breasts: Symmetrical, no skin changes or visible lesions. No palpable masses, nipple discharge or adenopathy bilaterally.  Abdominal:  Soft not distended. Bowel sounds are normal. She exhibits   no masses . No tenderness to palpation.   Genitourinary: Pelvic exam was performed with patient supine.   External genitalia normal no lesions.Normal hair distribution   Adequate perineal body,normal urethral meatus   Vagina moist and well rugated without lesions, no vaginal  Discharge.   Cervix pink and without lesions. No bleeding. No significant cystocele or rectocele.  Bimanual exam showed uterus normal size, shape and position , mobile and nontender. Adnexa without masses or tenderness. Urethra and bladder normal  Extremities normal no cyanosis ,edema.         A/P Janie FE Chelsey 37 y.o.     Dx=  1- AUB  2-uterine fibroids   3-UTI     Procedures/Orders:    Assessment /Plan:      UTI =- rx for ciprofloxacin       AUB/ uterine fibroids :  Patiens is counseled in detail about  uterine fibroids and abnormal uterine bleeding   Patient was counseled on management options   including observation   (expectant management) , medical treatment  and surgical alternatives (endometrial ablation and hysterectomy )  were discussed as well .      .   General information given about contributing risk factors, natural history of the condition and possible consequences and complications  .  Treatment  /management options also discussed.   After 3 months of medical management with Oral progesterone , now wants to proceed with  endometrial ablation   Counseled about the procedure, amenorrhea success rates and possibility of future hysterectomy  if symptoms do not improve       Patient was counseled today on A.C.S. Pap guidelines and recommendations for yearly pelvic exams, mammograms and monthly self breast exams; to see her PCP for other health maintenance, nutrition and weight gain counseling, discussed lab values.  Discussed colonoscopy recommendations every 10 years starting at age 50   Calcium and vit D daily intake     Info sent [facility] for scheduling    Consent signed   And case requested in the OR         I spent a total of 30 minutes on the day of the visit.This includes face to face time and non-face to face time preparing to see the patient (eg, review of tests), Obtaining and/or reviewing separately obtained history, Documenting clinical information in the electronic or other health record, Independently interpreting resultsand communicating results to the patient/family/caregiver, or Care coordination.      David Gary M.D.   OB/GYN

## 2023-05-22 LAB
BACTERIA UR CULT: ABNORMAL
C TRACH DNA SPEC QL NAA+PROBE: NOT DETECTED
N GONORRHOEA DNA SPEC QL NAA+PROBE: NOT DETECTED

## 2023-07-06 ENCOUNTER — TELEPHONE (OUTPATIENT)
Dept: OBSTETRICS AND GYNECOLOGY | Facility: CLINIC | Age: 38
End: 2023-07-06
Payer: MEDICAID

## 2023-07-06 NOTE — TELEPHONE ENCOUNTER
Patient was seen in the middle of May and message was sent to schedule but patient became positive with a UTI and that had to be cleared up before proceeding with the procedure. By then the provider was no longer able to perform surgery. Unfortunately the patient must establish care with a new provider in the practice and they will be able to put in a new request for surgery.

## 2023-07-06 NOTE — TELEPHONE ENCOUNTER
----- Message from Sarah Narvaez sent at 6/30/2023  1:10 PM CDT -----  .Type:  Needs Medical Advice    Who Called: pt    Would the patient rather a call back or a response via [facility]? Call back  Best Call Back Number: [phone]  Additional Information:     Pt stated she saw the Dr in May and someone was supposed to call pt to schedule a procedure and pt never received a call please call pt back

## 2023-07-06 NOTE — TELEPHONE ENCOUNTER
Spoke to pt and she informs us that was suppose to have an ablation done with Dr Gary and I informed her that he is actually not with [facility] anymore. I will forward message to surgery coordinator to get advise on this manner. PT verbalized understanding.

## 2023-07-06 NOTE — TELEPHONE ENCOUNTER
----- Message from Sarah Narvaez sent at 6/30/2023  1:10 PM CDT -----  .Type:  Needs Medical Advice    Who Called: pt    Would the patient rather a call back or a response via [facility]? Call back  Best Call Back Number: [phone]  Additional Information:     Pt stated she saw the Dr in May and someone was supposed to call pt to schedule a procedure and pt never received a call please call pt back

## 2023-07-31 ENCOUNTER — PATIENT MESSAGE (OUTPATIENT)
Dept: RESEARCH | Facility: HOSPITAL | Age: 38
End: 2023-07-31
Payer: MEDICAID

## 2023-09-01 ENCOUNTER — HOSPITAL ENCOUNTER (EMERGENCY)
Facility: HOSPITAL | Age: 38
Discharge: HOME | End: 2023-09-02
Attending: EMERGENCY MEDICINE
Payer: MEDICAID

## 2023-09-01 DIAGNOSIS — K43.9 VENTRAL HERNIA WITHOUT OBSTRUCTION OR GANGRENE: Primary | ICD-10-CM

## 2023-09-01 LAB
ALBUMIN SERPL BCP-MCNC: 3.9 G/DL (ref 3.5–5.2)
ALP SERPL-CCNC: 74 U/L (ref 55–135)
ALT SERPL W/O P-5'-P-CCNC: 10 U/L (ref 10–44)
ANION GAP SERPL CALC-SCNC: 8 MMOL/L (ref 8–16)
AST SERPL-CCNC: 13 U/L (ref 10–40)
B-HCG UR QL: NEGATIVE
BASOPHILS # BLD AUTO: 0.1 K/UL (ref 0–0.2)
BASOPHILS NFR BLD: 1.3 % (ref 0–1.9)
BILIRUB SERPL-MCNC: 0.2 MG/DL (ref 0.1–1)
BILIRUB UR QL STRIP: NEGATIVE
BUN SERPL-MCNC: 12 MG/DL (ref 6–20)
CALCIUM SERPL-MCNC: 8.7 MG/DL (ref 8.7–10.5)
CHLORIDE SERPL-SCNC: 111 MMOL/L (ref 95–110)
CLARITY UR: CLEAR
CO2 SERPL-SCNC: 21 MMOL/L (ref 23–29)
COLOR UR: YELLOW
CREAT SERPL-MCNC: 0.9 MG/DL (ref 0.5–1.4)
CTP QC/QA: YES
DIFFERENTIAL METHOD: ABNORMAL
EOSINOPHIL # BLD AUTO: 0.3 K/UL (ref 0–0.5)
EOSINOPHIL NFR BLD: 3.8 % (ref 0–8)
ERYTHROCYTE [DISTWIDTH] IN BLOOD BY AUTOMATED COUNT: 12.8 % (ref 11.5–14.5)
EST. GFR  (NO RACE VARIABLE): >60 ML/MIN/1.73 M^2
GLUCOSE SERPL-MCNC: 86 MG/DL (ref 70–110)
GLUCOSE UR QL STRIP: NEGATIVE
HCT VFR BLD AUTO: 34.3 % (ref 37–48.5)
HGB BLD-MCNC: 11.3 G/DL (ref 12–16)
HGB UR QL STRIP: NEGATIVE
IMM GRANULOCYTES # BLD AUTO: 0.02 K/UL (ref 0–0.04)
IMM GRANULOCYTES NFR BLD AUTO: 0.3 % (ref 0–0.5)
KETONES UR QL STRIP: NEGATIVE
LACTATE SERPL-SCNC: 1.2 MMOL/L (ref 0.5–2.2)
LEUKOCYTE ESTERASE UR QL STRIP: NEGATIVE
LYMPHOCYTES # BLD AUTO: 3.2 K/UL (ref 1–4.8)
LYMPHOCYTES NFR BLD: 42.4 % (ref 18–48)
MCH RBC QN AUTO: 31.6 PG (ref 27–31)
MCHC RBC AUTO-ENTMCNC: 32.9 G/DL (ref 32–36)
MCV RBC AUTO: 96 FL (ref 82–98)
MONOCYTES # BLD AUTO: 0.6 K/UL (ref 0.3–1)
MONOCYTES NFR BLD: 8.1 % (ref 4–15)
NEUTROPHILS # BLD AUTO: 3.3 K/UL (ref 1.8–7.7)
NEUTROPHILS NFR BLD: 44.1 % (ref 38–73)
NITRITE UR QL STRIP: NEGATIVE
NRBC BLD-RTO: 0 /100 WBC
PH UR STRIP: 7 [PH] (ref 5–8)
PLATELET # BLD AUTO: 368 K/UL (ref 150–450)
PMV BLD AUTO: 11.1 FL (ref 9.2–12.9)
POTASSIUM SERPL-SCNC: 3.9 MMOL/L (ref 3.5–5.1)
PROT SERPL-MCNC: 7.1 G/DL (ref 6–8.4)
PROT UR QL STRIP: ABNORMAL
RBC # BLD AUTO: 3.58 M/UL (ref 4–5.4)
SODIUM SERPL-SCNC: 140 MMOL/L (ref 136–145)
SP GR UR STRIP: >1.03 (ref 1–1.03)
URN SPEC COLLECT METH UR: ABNORMAL
UROBILINOGEN UR STRIP-ACNC: ABNORMAL EU/DL
WBC # BLD AUTO: 7.54 K/UL (ref 3.9–12.7)

## 2023-09-01 PROCEDURE — 85025 COMPLETE CBC W/AUTO DIFF WBC: CPT | Performed by: NURSE PRACTITIONER

## 2023-09-01 PROCEDURE — 81003 URINALYSIS AUTO W/O SCOPE: CPT | Performed by: NURSE PRACTITIONER

## 2023-09-01 PROCEDURE — 80053 COMPREHEN METABOLIC PANEL: CPT | Performed by: NURSE PRACTITIONER

## 2023-09-01 PROCEDURE — 99285 EMERGENCY DEPT VISIT HI MDM: CPT | Mod: 25

## 2023-09-01 PROCEDURE — 81025 URINE PREGNANCY TEST: CPT | Performed by: NURSE PRACTITIONER

## 2023-09-01 PROCEDURE — 63600175 PHARM REV CODE 636 W HCPCS: Performed by: EMERGENCY MEDICINE

## 2023-09-01 PROCEDURE — 96375 TX/PRO/DX INJ NEW DRUG ADDON: CPT

## 2023-09-01 PROCEDURE — 25500020 PHARM REV CODE 255: Performed by: EMERGENCY MEDICINE

## 2023-09-01 PROCEDURE — 96361 HYDRATE IV INFUSION ADD-ON: CPT

## 2023-09-01 PROCEDURE — 96374 THER/PROPH/DIAG INJ IV PUSH: CPT

## 2023-09-01 PROCEDURE — 25000003 PHARM REV CODE 250: Performed by: EMERGENCY MEDICINE

## 2023-09-01 PROCEDURE — 83605 ASSAY OF LACTIC ACID: CPT | Performed by: EMERGENCY MEDICINE

## 2023-09-01 RX ORDER — ONDANSETRON 2 MG/ML
4 INJECTION INTRAMUSCULAR; INTRAVENOUS
Status: COMPLETED | OUTPATIENT
Start: 2023-09-01 | End: 2023-09-01

## 2023-09-01 RX ORDER — KETOROLAC TROMETHAMINE 30 MG/ML
10 INJECTION, SOLUTION INTRAMUSCULAR; INTRAVENOUS
Status: COMPLETED | OUTPATIENT
Start: 2023-09-01 | End: 2023-09-01

## 2023-09-01 RX ADMIN — SODIUM CHLORIDE 1000 ML: 9 INJECTION, SOLUTION INTRAVENOUS at 09:09

## 2023-09-01 RX ADMIN — IOHEXOL 100 ML: 350 INJECTION, SOLUTION INTRAVENOUS at 09:09

## 2023-09-01 RX ADMIN — KETOROLAC TROMETHAMINE 10 MG: 30 INJECTION, SOLUTION INTRAMUSCULAR; INTRAVENOUS at 08:09

## 2023-09-01 RX ADMIN — ONDANSETRON 4 MG: 2 INJECTION INTRAMUSCULAR; INTRAVENOUS at 08:09

## 2023-09-02 VITALS
SYSTOLIC BLOOD PRESSURE: 124 MMHG | DIASTOLIC BLOOD PRESSURE: 73 MMHG | BODY MASS INDEX: 33.99 KG/M2 | HEART RATE: 82 BPM | RESPIRATION RATE: 18 BRPM | OXYGEN SATURATION: 99 % | TEMPERATURE: 98 F | WEIGHT: 198 LBS

## 2023-09-02 RX ORDER — KETOROLAC TROMETHAMINE 10 MG/1
10 TABLET, FILM COATED ORAL EVERY 6 HOURS
Qty: 20 TABLET | Refills: 0 | Status: SHIPPED | OUTPATIENT
Start: 2023-09-02 | End: 2023-09-07

## 2023-09-02 NOTE — ED PROVIDER NOTES
Encounter Date: 2023       History     Chief Complaint   Patient presents with    Hernia     Pt states that she has a umbilical hernia and has been having more pain than usual around the area.      Janie Barbosa is a 37 y.o. female who  has no past medical history on file.    The patient presents to the ED due to abdominal pain.  Patient reports that for the past couple of years she is had a known hernia to her abdominal wall.  She states over the past couple of days it has been increasing in pain.  She reports no fever vomiting change in bowel habits and is passing gas.  She has not seen a surgeon for this pain she is not tried anything for pain.  No other concerns at this time.    The history is provided by the patient.     Review of patient's allergies indicates:   Allergen Reactions    Shellfish containing products Anaphylaxis and Swelling    Codeine Nausea And Vomiting     No past medical history on file.  Past Surgical History:   Procedure Laterality Date     SECTION      CYST REMOVAL      DILATION AND CURETTAGE OF UTERUS      ECTOPIC PREGNANCY SURGERY      SALPINGECTOMY Right     SINUS SURGERY      TUBAL LIGATION       Family History   Problem Relation Age of Onset    Breast cancer Neg Hx     Colon cancer Neg Hx     Ovarian cancer Neg Hx      Social History     Tobacco Use    Smoking status: Never    Smokeless tobacco: Never   Substance Use Topics    Alcohol use: No    Drug use: No     Review of Systems   Constitutional:  Negative for chills and fever.   HENT:  Negative for sore throat.    Respiratory:  Negative for cough and shortness of breath.    Cardiovascular:  Negative for chest pain.   Gastrointestinal:  Positive for abdominal pain. Negative for nausea and vomiting.   Genitourinary:  Negative for dysuria, frequency and urgency.   Musculoskeletal:  Negative for back pain, neck pain and neck stiffness.   Skin:  Negative for rash and wound.   Neurological:  Negative for syncope and  weakness.   Hematological:  Does not bruise/bleed easily.   Psychiatric/Behavioral:  Negative for agitation, behavioral problems and confusion.        Physical Exam     Initial Vitals [09/01/23 1845]   BP Pulse Resp Temp SpO2   120/74 (!) 14 16 98.7 °F (37.1 °C) 99 %      MAP       --         Physical Exam    Constitutional: No distress.   HENT:   Head: Normocephalic and atraumatic.   Eyes: Conjunctivae are normal.   Cardiovascular:  Intact distal pulses.           Pulmonary/Chest: No respiratory distress.   Abdominal: There is abdominal tenderness.   Abdominal wall defect without associated skin changes.  Diffuse tenderness  without rebound or guarding   Musculoskeletal:         General: No edema.     Neurological: She is alert.   Skin: Skin is warm and dry.   Psychiatric: She has a normal mood and affect.         ED Course   Procedures  Labs Reviewed   URINALYSIS, REFLEX TO URINE CULTURE - Abnormal; Notable for the following components:       Result Value    Specific Gravity, UA >1.030 (*)     Protein, UA Trace (*)     Urobilinogen, UA 2.0-3.0 (*)     All other components within normal limits    Narrative:     Specimen Source->Urine   CBC W/ AUTO DIFFERENTIAL - Abnormal; Notable for the following components:    RBC 3.58 (*)     Hemoglobin 11.3 (*)     Hematocrit 34.3 (*)     MCH 31.6 (*)     All other components within normal limits   COMPREHENSIVE METABOLIC PANEL - Abnormal; Notable for the following components:    Chloride 111 (*)     CO2 21 (*)     All other components within normal limits   LACTIC ACID, PLASMA   POCT URINE PREGNANCY          Imaging Results              CT Abdomen Pelvis With Contrast (Final result)  Result time 09/01/23 22:28:53      Final result by Beni Nicolas MD (09/01/23 22:28:53)                   Impression:      Increasing size of anterior abdominal wall fat hernia through 12 mm defect.  Mild fluid and edema in the herniated fat may represent inflammation and indicate  incarceration.  Correlate for panniculitis.    New 8 mm enhancing nodule in the fundus of the uterus.  Correlate for endometrial polyp.  Leiomyoma is within the differential.    No acute findings elsewhere within the abdomen or pelvis.      Electronically signed by: Beni Nicolas  Date:    09/01/2023  Time:    22:28               Narrative:    EXAMINATION:  CT ABDOMEN PELVIS WITH CONTRAST    CLINICAL HISTORY:  Bowel obstruction suspected;ventral hernia with significant pain;    TECHNIQUE:  Low dose axial images, sagittal and coronal reformations were obtained from the lung bases to the pubic symphysis following the IV administration of 100 mL of Omnipaque 350 no oral    COMPARISON:  CT abdomen and pelvis, 03/17/2021    FINDINGS:  Anterior abdominal wall hernia containing fat has increased in diameter.  The defect now measures 12 mm previously 9 mm and the herniated fat pocket measures nearly 5 cm with some increased density centrally suggesting edema.  It previously measured 1.6 cm.    The remainder of the abdominal wall appears intact.    The base of the heart pericardium and lung bases appear stable.    The liver, spleen, pancreas, contracted gallbladder, right and left kidneys, adrenal glands, IVC and aorta appear stable and normal.    The loops of large and small intestines appear stable and unremarkable with normal appearing appendix.                                       Medications   ketorolac injection 9.999 mg (9.999 mg Intravenous Given 9/1/23 2016)   ondansetron injection 4 mg (4 mg Intravenous Given 9/1/23 2015)   sodium chloride 0.9% bolus 1,000 mL 1,000 mL (0 mLs Intravenous Stopped 9/1/23 2320)   iohexoL (OMNIPAQUE 350) injection 100 mL (100 mLs Intravenous Given 9/1/23 2105)     Medical Decision Making  Nontoxic appearing 37-year-old woman with abdominal pain and associated hernia.  Will plan labs CT imaging treat and reassess    Differential Diagnosis includes, but is not limited to:  AAA, aortic  dissection, mesenteric ischemia, perforated viscous, MI/ACS, SBO/volvulus, incarcerated/strangulated hernia, intussusception, ileus, appendicitis, cholecystitis, cholangitis, diverticulitis, esophagitis, hepatitis, nephrolithiasis, pancreatitis, gastroenteritis, colitis, IBD/IBS, biliary colic, GERD, PUD, constipation, UTI/pyelonephritis,  disorder.      Problems Addressed:  Ventral hernia without obstruction or gangrene: chronic illness or injury     Details: Patient presents with worsening hernia.  No signs this time to suggest obstruction strangulation or emergent process.  Her pain is controlled on reassessment.  Abdominal exam remained stable and benign.  Will plan to refer to general surgery with analgesia.  Discussed return precautions for worsening symptoms including abdominal pain new symptoms such as vomiting inability to pass gas constipation or any other concerns    Amount and/or Complexity of Data Reviewed  Labs:      Details: CBC without significant findings  CMP without significant findings  Lipase within normal limits  UA without significant findings  Lactate within normal limits  UPT negative  Radiology: ordered.     Details: CT imaging reviewed, no signs at this time to suggest obstruction.  Discussion of management or test interpretation with external provider(s): Discussed with Dr. Alejandre, given no signs of obstruction and pain relatively controlled recommends discharge with close follow-up with surgery.    Risk  Prescription drug management.  Decision regarding hospitalization.                              Medical Decision Making:   ED Management:  After taking into careful account the historical factors and physical exam findings of the patient's presentation today, in conjunction with the empirical and objective data obtained on ED workup, no acute emergent medical condition has been identified. The patient appears to be low risk for an emergent medical condition and I feel it is safe and  appropriate at this time for the patient to be discharged to follow-up as detailed in their discharge instructions for reevaluation and possible continued outpatient workup and management. I have discussed the specifics of the workup with the patient and the patient has verbalized understanding of the details of the workup, the diagnosis, the treatment plan, and the need for outpatient follow-up.  Although the patient has no emergent etiology today this does not preclude the development of an emergent condition so in addition, I have advised the patient that they can return to the ED and/or activate EMS at any time with worsening of their symptoms, change of their symptoms, or with any other medical complaint.  The patient remained comfortable and stable during their visit in the ED.  Discharge and follow-up instructions discussed with the patient who expressed understanding and willingness to comply with my recommendations.          Clinical Impression:   Final diagnoses:  [K43.9] Ventral hernia without obstruction or gangrene (Primary)        ED Disposition Condition    Discharge Stable          ED Prescriptions       Medication Sig Dispense Start Date End Date Auth. Provider    ketorolac (TORADOL) 10 mg tablet Take 1 tablet (10 mg total) by mouth every 6 (six) hours. for 5 days 20 tablet 9/2/2023 9/7/2023 Bernard Abdi Jr., MD          Follow-up Information       Follow up With Specialties Details Why Contact Info Additional Information    Renita Tom MD Family Medicine In 3 days  [address] [place] [address] [address] [address] [phone]       [facility] General Surgery Surgery Schedule an appointment as soon as possible for a visit   [address]  [facility] [address] [phone] Please park in [address] or D and use Ilia santos. Take Medical Office Bldg elevators.            Portions of this note were dictated using voice recognition software and may contain dictation related  errors in spelling/grammar/syntax not found on text review       Bernard Abdi Jr., MD  09/03/23 3062

## 2023-09-02 NOTE — DISCHARGE INSTRUCTIONS

## 2023-09-02 NOTE — FIRST PROVIDER EVALUATION
Emergency Department TeleTriage Encounter Note      CHIEF COMPLAINT    Chief Complaint   Patient presents with    Hernia     Pt states that she has a umbilical hernia and has been having more pain than usual around the area.        VITAL SIGNS   Initial Vitals [09/01/23 1845]   BP Pulse Resp Temp SpO2   120/74 (!) 14 16 98.7 °F (37.1 °C) 99 %      MAP       --            ALLERGIES    Review of patient's allergies indicates:   Allergen Reactions    Shellfish containing products Anaphylaxis and Swelling    Codeine Nausea And Vomiting       PROVIDER TRIAGE NOTE  This is a teletriage evaluation of a 37 y.o. female presenting to the ED with c/o umbilical hernia ongoing since last week. No vomiting. CT from 3/2021 with 'small fat containing ventral abdominal hernia'. Will defer imaging until the abdomen can be examined, will order basic labs in the event the patient will need imaging. HR documented as 14, patient is awake, alert, and conversational, suspect error. Limited physical exam via telehealth: The patient is awake, alert, answering questions appropriately and is not in respiratory distress. Initial orders will be placed and care will be transferred to an alternate provider when patient is roomed for a full evaluation. Any additional orders and the final disposition will be determined by that provider.         ORDERS  Labs Reviewed   URINALYSIS, REFLEX TO URINE CULTURE   CBC W/ AUTO DIFFERENTIAL   COMPREHENSIVE METABOLIC PANEL   POCT URINE PREGNANCY       ED Orders (720h ago, onward)      Start Ordered     Status Ordering Provider    09/01/23 1919 09/01/23 1918  CBC auto differential  STAT         Ordered GABY SEVILLA    09/01/23 1919 09/01/23 1918  Comprehensive metabolic panel  STAT         Ordered GABY SEVILLA    09/01/23 1919 09/01/23 1918  Saline lock IV  Once         Ordered GABY SEVILLA    09/01/23 1918 09/01/23 1917  POCT urine pregnancy  Once         Ordered ANNIA GIANG     09/01/23 1918 09/01/23 1917  Urinalysis, Reflex to Urine Culture Urine, Clean Catch  STAT         Ordered ANNIA GIANG              Virtual Visit Note: The provider triage portion of this emergency department evaluation and documentation was performed via [email or web address], a HIPAA-compliant telemedicine application, in concert with a tele-presenter in the room. A face to face patient evaluation with one of my colleagues will occur once the patient is placed in an emergency department room.      DISCLAIMER: This note was prepared with [employer] voice recognition transcription software. Garbled syntax, mangled pronouns, and other bizarre constructions may be attributed to that software system.

## 2023-09-14 ENCOUNTER — OFFICE VISIT (OUTPATIENT)
Dept: SURGERY | Facility: CLINIC | Age: 38
End: 2023-09-14
Payer: MEDICAID

## 2023-09-14 VITALS
HEIGHT: 64 IN | WEIGHT: 199.94 LBS | HEART RATE: 91 BPM | BODY MASS INDEX: 34.13 KG/M2 | OXYGEN SATURATION: 98 % | DIASTOLIC BLOOD PRESSURE: 70 MMHG | SYSTOLIC BLOOD PRESSURE: 96 MMHG

## 2023-09-14 DIAGNOSIS — K43.9 VENTRAL HERNIA WITHOUT OBSTRUCTION OR GANGRENE: ICD-10-CM

## 2023-09-14 PROCEDURE — 3008F BODY MASS INDEX DOCD: CPT | Mod: CPTII,,, | Performed by: SURGERY

## 2023-09-14 PROCEDURE — 1159F PR MEDICATION LIST DOCUMENTED IN MEDICAL RECORD: ICD-10-PCS | Mod: CPTII,,, | Performed by: SURGERY

## 2023-09-14 PROCEDURE — 3078F PR MOST RECENT DIASTOLIC BLOOD PRESSURE < 80 MM HG: ICD-10-PCS | Mod: CPTII,,, | Performed by: SURGERY

## 2023-09-14 PROCEDURE — 3078F DIAST BP <80 MM HG: CPT | Mod: CPTII,,, | Performed by: SURGERY

## 2023-09-14 PROCEDURE — 99999 PR PBB SHADOW E&M-EST. PATIENT-LVL IV: CPT | Mod: PBBFAC,,, | Performed by: SURGERY

## 2023-09-14 PROCEDURE — 1160F RVW MEDS BY RX/DR IN RCRD: CPT | Mod: CPTII,,, | Performed by: SURGERY

## 2023-09-14 PROCEDURE — 99204 PR OFFICE/OUTPT VISIT, NEW, LEVL IV, 45-59 MIN: ICD-10-PCS | Mod: S$PBB,,, | Performed by: SURGERY

## 2023-09-14 PROCEDURE — 99999 PR PBB SHADOW E&M-EST. PATIENT-LVL IV: ICD-10-PCS | Mod: PBBFAC,,, | Performed by: SURGERY

## 2023-09-14 PROCEDURE — 1160F PR REVIEW ALL MEDS BY PRESCRIBER/CLIN PHARMACIST DOCUMENTED: ICD-10-PCS | Mod: CPTII,,, | Performed by: SURGERY

## 2023-09-14 PROCEDURE — 3008F PR BODY MASS INDEX (BMI) DOCUMENTED: ICD-10-PCS | Mod: CPTII,,, | Performed by: SURGERY

## 2023-09-14 PROCEDURE — 99204 OFFICE O/P NEW MOD 45 MIN: CPT | Mod: S$PBB,,, | Performed by: SURGERY

## 2023-09-14 PROCEDURE — 3074F SYST BP LT 130 MM HG: CPT | Mod: CPTII,,, | Performed by: SURGERY

## 2023-09-14 PROCEDURE — 99214 OFFICE O/P EST MOD 30 MIN: CPT | Mod: PBBFAC,PN | Performed by: SURGERY

## 2023-09-14 PROCEDURE — 1159F MED LIST DOCD IN RCRD: CPT | Mod: CPTII,,, | Performed by: SURGERY

## 2023-09-14 PROCEDURE — 3074F PR MOST RECENT SYSTOLIC BLOOD PRESSURE < 130 MM HG: ICD-10-PCS | Mod: CPTII,,, | Performed by: SURGERY

## 2023-09-14 RX ORDER — EPINEPHRINE 0.3 MG/.3ML
INJECTION SUBCUTANEOUS
COMMUNITY
Start: 2023-06-02

## 2023-09-14 RX ORDER — ERGOCALCIFEROL 1.25 MG/1
50000 CAPSULE ORAL
COMMUNITY
Start: 2023-06-26

## 2023-09-14 NOTE — PROGRESS NOTES
[facility]  OUTPATIENT H&P    REASON FOR VISIT/CC: Ventral hernia    HPI: Janie Barbosa is a 37 y.o. female with known supraumbilical ventral hernia.  Pt notes occasional mild pain only.  She did have severe pain recently that required ER visit.  CT noted and reviewed, fat containing hernia which was reduced.  She denies N/V or changes in bowel habits.  Non-smoker, non-diabetic.  BMI 34, no prior repairs.    I have reviewed the patient's chart including prior progress notes, procedures and testing.     ROS:   Review of Systems    PROBLEM LIST:  Patient Active Problem List   Diagnosis    Ectopic pregnancy    Ovarian torsion         HISTORY  History reviewed. No pertinent past medical history.    Past Surgical History:   Procedure Laterality Date     SECTION      CYST REMOVAL      DILATION AND CURETTAGE OF UTERUS      ECTOPIC PREGNANCY SURGERY      SALPINGECTOMY Right     SINUS SURGERY      TUBAL LIGATION         Social History     Tobacco Use    Smoking status: Never    Smokeless tobacco: Never   Substance Use Topics    Alcohol use: No    Drug use: No       Family History   Problem Relation Age of Onset    Breast cancer Neg Hx     Colon cancer Neg Hx     Ovarian cancer Neg Hx          MEDS:  Current Outpatient Medications on File Prior to Visit   Medication Sig Dispense Refill    ergocalciferol (ERGOCALCIFEROL) 50,000 unit Cap Take 50,000 Units by mouth every 7 days.      clotrimazole-betamethasone 1-0.05% (LOTRISONE) cream Apply topically 2 (two) times daily. (Patient not taking: Reported on 2023) 15 g 1    EPINEPHrine (EPIPEN) 0.3 mg/0.3 mL AtIn Inject into the muscle.      etodolac (LODINE XL) 400 MG 24 hr tablet Take 400 mg by mouth daily as needed.      fluticasone propionate (FLONASE) 50 mcg/actuation nasal spray 1 spray (50 mcg total) by Each Nostril route 2 (two) times daily as needed for Rhinitis. (Patient not taking: Reported on 1/3/2023) 15 g 0    fluticasone propionate  (FLONASE) 50 mcg/actuation nasal spray 1 spray (50 mcg total) by Each Nostril route 2 (two) times daily as needed for Rhinitis. (Patient not taking: Reported on 1/3/2023) 15 g 0    ibuprofen (ADVIL,MOTRIN) 600 MG tablet Take 1 tablet (600 mg total) by mouth every 6 (six) hours as needed for Pain. (Patient not taking: Reported on 4/27/2023) 20 tablet 0    levocetirizine (XYZAL) 5 MG tablet Take 1 tablet (5 mg total) by mouth every evening. 30 tablet 0    medroxyPROGESTERone (PROVERA) 10 MG tablet Take 1 tablet (10 mg total) by mouth once daily. 30 tablet 3    terconazole (TERAZOL 7) 0.4 % Crea Place 1 applicator vaginally every evening. (Patient not taking: Reported on 4/27/2023) 7 g 0    terconazole (TERAZOL 7) 0.4 % Crea Place 1 applicator vaginally every evening. 7 g 0    terconazole (TERAZOL 7) 0.4 % Crea Place 1 applicator vaginally every evening. 7 g 0     No current facility-administered medications on file prior to visit.       ALLERGIES:  Review of patient's allergies indicates:   Allergen Reactions    Shellfish containing products Anaphylaxis and Swelling    Codeine Nausea And Vomiting         VITALS:  Vitals:    09/14/23 0904   BP: 96/70   Pulse: 91         PHYSICAL EXAM:  Physical Exam  Constitutional:       Appearance: Normal appearance.   HENT:      Head: Normocephalic and atraumatic.   Pulmonary:      Effort: Pulmonary effort is normal.   Abdominal:      Palpations: Abdomen is soft.      Tenderness: There is no abdominal tenderness. There is no guarding or rebound.      Hernia: A hernia is present.   Skin:     General: Skin is warm and dry.   Neurological:      Mental Status: Mental status is at baseline.   Psychiatric:         Mood and Affect: Mood normal.         Behavior: Behavior normal.           LABS:  Lab Results   Component Value Date    WBC 7.54 09/01/2023    RBC 3.58 (L) 09/01/2023    HGB 11.3 (L) 09/01/2023    HCT 34.3 (L) 09/01/2023    HCT 37 03/16/2021     09/01/2023     Lab Results  "  Component Value Date    GLU 86 09/01/2023     09/01/2023    K 3.9 09/01/2023     (H) 09/01/2023    CO2 21 (L) 09/01/2023    BUN 12 09/01/2023    CREATININE 0.9 09/01/2023    CALCIUM 8.7 09/01/2023     Lab Results   Component Value Date    ALT 10 09/01/2023    AST 13 09/01/2023    ALKPHOS 74 09/01/2023    BILITOT 0.2 09/01/2023     No results found for: "MG", "PHOS"    STUDIES:  CT images and reports were personally reviewed.        ASSESSMENT & PLAN:  37 y.o. female with ventral hernia.  We discussed repair vs observation.  She wants to continue observation at this point.  I'd be happy to repair the hernia in the future if she desires and her BMI remains under 35.      Basil Alejandre M.D., F.A.C.S.  [facility] and General Surgery  [facility]        "

## 2023-10-10 ENCOUNTER — TELEPHONE (OUTPATIENT)
Dept: OBSTETRICS AND GYNECOLOGY | Facility: CLINIC | Age: 38
End: 2023-10-10
Payer: MEDICAID

## 2023-10-10 NOTE — TELEPHONE ENCOUNTER
Called and spoke to pt. Informed her that there was not any sooner appointments currently. Pt was okay with me adding her to the wait list. Pt verbalized understanding and had no further questions.    ----- Message from Momo Mitchell sent at 10/9/2023  2:38 PM CDT -----  Contact: NP  Type:  Sooner Appointment Request    Caller is requesting a sooner appointment.  Caller declined first available appointment listed below.  Caller will not accept being placed on the waitlist and is requesting a message be sent to doctor.  Name of Caller:NP  When is the first available appointment?01/24  Symptoms:annual/ other health concerns   Would the patient rather a call back or a response via [facility]? call  Best Call Back Number:[phone]  Additional Information:

## 2023-10-31 ENCOUNTER — OFFICE VISIT (OUTPATIENT)
Dept: OBSTETRICS AND GYNECOLOGY | Facility: CLINIC | Age: 38
End: 2023-10-31
Payer: MEDICAID

## 2023-10-31 VITALS — DIASTOLIC BLOOD PRESSURE: 62 MMHG | BODY MASS INDEX: 33.68 KG/M2 | WEIGHT: 196.19 LBS | SYSTOLIC BLOOD PRESSURE: 92 MMHG

## 2023-10-31 DIAGNOSIS — N93.9 ABNORMAL UTERINE BLEEDING (AUB): ICD-10-CM

## 2023-10-31 DIAGNOSIS — N89.8 VAGINAL DISCHARGE: Primary | ICD-10-CM

## 2023-10-31 PROCEDURE — 1159F MED LIST DOCD IN RCRD: CPT | Mod: CPTII,,, | Performed by: STUDENT IN AN ORGANIZED HEALTH CARE EDUCATION/TRAINING PROGRAM

## 2023-10-31 PROCEDURE — 3074F PR MOST RECENT SYSTOLIC BLOOD PRESSURE < 130 MM HG: ICD-10-PCS | Mod: CPTII,,, | Performed by: STUDENT IN AN ORGANIZED HEALTH CARE EDUCATION/TRAINING PROGRAM

## 2023-10-31 PROCEDURE — 87591 N.GONORRHOEAE DNA AMP PROB: CPT | Performed by: STUDENT IN AN ORGANIZED HEALTH CARE EDUCATION/TRAINING PROGRAM

## 2023-10-31 PROCEDURE — 3074F SYST BP LT 130 MM HG: CPT | Mod: CPTII,,, | Performed by: STUDENT IN AN ORGANIZED HEALTH CARE EDUCATION/TRAINING PROGRAM

## 2023-10-31 PROCEDURE — 99213 OFFICE O/P EST LOW 20 MIN: CPT | Mod: S$PBB,,, | Performed by: STUDENT IN AN ORGANIZED HEALTH CARE EDUCATION/TRAINING PROGRAM

## 2023-10-31 PROCEDURE — 3078F DIAST BP <80 MM HG: CPT | Mod: CPTII,,, | Performed by: STUDENT IN AN ORGANIZED HEALTH CARE EDUCATION/TRAINING PROGRAM

## 2023-10-31 PROCEDURE — 87491 CHLMYD TRACH DNA AMP PROBE: CPT | Performed by: STUDENT IN AN ORGANIZED HEALTH CARE EDUCATION/TRAINING PROGRAM

## 2023-10-31 PROCEDURE — 3008F BODY MASS INDEX DOCD: CPT | Mod: CPTII,,, | Performed by: STUDENT IN AN ORGANIZED HEALTH CARE EDUCATION/TRAINING PROGRAM

## 2023-10-31 PROCEDURE — 99213 OFFICE O/P EST LOW 20 MIN: CPT | Mod: PBBFAC,PO | Performed by: STUDENT IN AN ORGANIZED HEALTH CARE EDUCATION/TRAINING PROGRAM

## 2023-10-31 PROCEDURE — 1160F RVW MEDS BY RX/DR IN RCRD: CPT | Mod: CPTII,,, | Performed by: STUDENT IN AN ORGANIZED HEALTH CARE EDUCATION/TRAINING PROGRAM

## 2023-10-31 PROCEDURE — 1160F PR REVIEW ALL MEDS BY PRESCRIBER/CLIN PHARMACIST DOCUMENTED: ICD-10-PCS | Mod: CPTII,,, | Performed by: STUDENT IN AN ORGANIZED HEALTH CARE EDUCATION/TRAINING PROGRAM

## 2023-10-31 PROCEDURE — 3078F PR MOST RECENT DIASTOLIC BLOOD PRESSURE < 80 MM HG: ICD-10-PCS | Mod: CPTII,,, | Performed by: STUDENT IN AN ORGANIZED HEALTH CARE EDUCATION/TRAINING PROGRAM

## 2023-10-31 PROCEDURE — 99999 PR PBB SHADOW E&M-EST. PATIENT-LVL III: CPT | Mod: PBBFAC,,, | Performed by: STUDENT IN AN ORGANIZED HEALTH CARE EDUCATION/TRAINING PROGRAM

## 2023-10-31 PROCEDURE — 99213 PR OFFICE/OUTPT VISIT, EST, LEVL III, 20-29 MIN: ICD-10-PCS | Mod: S$PBB,,, | Performed by: STUDENT IN AN ORGANIZED HEALTH CARE EDUCATION/TRAINING PROGRAM

## 2023-10-31 PROCEDURE — 81514 NFCT DS BV&VAGINITIS DNA ALG: CPT | Performed by: STUDENT IN AN ORGANIZED HEALTH CARE EDUCATION/TRAINING PROGRAM

## 2023-10-31 PROCEDURE — 3008F PR BODY MASS INDEX (BMI) DOCUMENTED: ICD-10-PCS | Mod: CPTII,,, | Performed by: STUDENT IN AN ORGANIZED HEALTH CARE EDUCATION/TRAINING PROGRAM

## 2023-10-31 PROCEDURE — 1159F PR MEDICATION LIST DOCUMENTED IN MEDICAL RECORD: ICD-10-PCS | Mod: CPTII,,, | Performed by: STUDENT IN AN ORGANIZED HEALTH CARE EDUCATION/TRAINING PROGRAM

## 2023-10-31 PROCEDURE — 99999 PR PBB SHADOW E&M-EST. PATIENT-LVL III: ICD-10-PCS | Mod: PBBFAC,,, | Performed by: STUDENT IN AN ORGANIZED HEALTH CARE EDUCATION/TRAINING PROGRAM

## 2023-10-31 RX ORDER — METRONIDAZOLE 500 MG/1
500 TABLET ORAL EVERY 12 HOURS
Qty: 14 TABLET | Refills: 0 | Status: SHIPPED | OUTPATIENT
Start: 2023-10-31 | End: 2023-11-07

## 2023-10-31 NOTE — PROGRESS NOTES
"History & Physical  Gynecology      SUBJECTIVE:     Chief Complaint: Vaginal Discharge/AUB       History of Present Illness:  Janie is a 37 yr old  who presents to clinic with complaints of increased vaginal discharge with a "fishy" odor. She denies vaginal irritation or itching. She was also previously seen and evaluated by Dr. Gary (no longer with our practice) for AUB. They had previously planned an endometrial ablation which she remains interested in.     Review of patient's allergies indicates:   Allergen Reactions    Shellfish containing products Anaphylaxis and Swelling    Codeine Nausea And Vomiting       History reviewed. No pertinent past medical history.  Past Surgical History:   Procedure Laterality Date     SECTION      CYST REMOVAL      DILATION AND CURETTAGE OF UTERUS      ECTOPIC PREGNANCY SURGERY      SALPINGECTOMY Right     SINUS SURGERY      TUBAL LIGATION       OB History          6    Para   4    Term   4            AB   2    Living   4         SAB        IAB        Ectopic        Multiple        Live Births   4               Family History   Problem Relation Age of Onset    Breast cancer Neg Hx     Colon cancer Neg Hx     Ovarian cancer Neg Hx      Social History     Tobacco Use    Smoking status: Never    Smokeless tobacco: Never   Substance Use Topics    Alcohol use: No    Drug use: No       Current Outpatient Medications   Medication Sig    clotrimazole-betamethasone 1-0.05% (LOTRISONE) cream Apply topically 2 (two) times daily.    EPINEPHrine (EPIPEN) 0.3 mg/0.3 mL AtIn Inject into the muscle.    ergocalciferol (ERGOCALCIFEROL) 50,000 unit Cap Take 50,000 Units by mouth every 7 days.    etodolac (LODINE XL) 400 MG 24 hr tablet Take 400 mg by mouth daily as needed.    fluticasone propionate (FLONASE) 50 mcg/actuation nasal spray 1 spray (50 mcg total) by Each Nostril route 2 (two) times daily as needed for Rhinitis.    ibuprofen (ADVIL,MOTRIN) 600 MG tablet " Take 1 tablet (600 mg total) by mouth every 6 (six) hours as needed for Pain.    fluticasone propionate (FLONASE) 50 mcg/actuation nasal spray 1 spray (50 mcg total) by Each Nostril route 2 (two) times daily as needed for Rhinitis. (Patient not taking: Reported on 1/3/2023)    levocetirizine (XYZAL) 5 MG tablet Take 1 tablet (5 mg total) by mouth every evening.    medroxyPROGESTERone (PROVERA) 10 MG tablet Take 1 tablet (10 mg total) by mouth once daily.    terconazole (TERAZOL 7) 0.4 % Crea Place 1 applicator vaginally every evening. (Patient not taking: Reported on 4/27/2023)    terconazole (TERAZOL 7) 0.4 % Crea Place 1 applicator vaginally every evening.    terconazole (TERAZOL 7) 0.4 % Crea Place 1 applicator vaginally every evening.     No current facility-administered medications for this visit.       Review of Systems:  Review of Systems     OBJECTIVE:     Physical Exam:  Vitals:    10/31/23 1012   BP: 92/62      Physical Exam    ASSESSMENT/PLAN:     1. Vaginal discharge  - Clinical exam consistent with BV. Rx for Flagyl provided  - Vaginosis Screen by DNA Probe  - C. trachomatis/N. gonorrhoeae by AMP DNA    2. Abnormal uterine bleeding (AUB)  - Prior work-up reviewed  - Small fibroids noted  - No endometrial sampling noted  - Discussed endometrial ablation and postoperative expectations  - Will schedule EMBX      Sydnee Thorpe M.D.  OB/GYN  [facility]

## 2023-11-02 LAB
C TRACH DNA SPEC QL NAA+PROBE: NOT DETECTED
N GONORRHOEA DNA SPEC QL NAA+PROBE: NOT DETECTED

## 2023-11-06 ENCOUNTER — TELEPHONE (OUTPATIENT)
Dept: OBSTETRICS AND GYNECOLOGY | Facility: CLINIC | Age: 38
End: 2023-11-06
Payer: MEDICAID

## 2023-11-06 NOTE — TELEPHONE ENCOUNTER
----- Message from Zarina Nuno sent at 11/6/2023  9:34 AM CST -----  Type:  Procedure Reschedule    Who Called: pt  Would the patient rather a call back or a response via [facility]? call  Best Call Back Number: [phone]  Additional Information: pt would like to reschedule procedure for today 11/6 to tomorrow for 11:15

## 2023-11-06 NOTE — TELEPHONE ENCOUNTER
Spoke to pt and helped rs today's procedure appt to 11/14/23 at same time 11:15 marshal Mendoza to schedule.

## 2023-11-07 ENCOUNTER — TELEPHONE (OUTPATIENT)
Dept: OBSTETRICS AND GYNECOLOGY | Facility: CLINIC | Age: 38
End: 2023-11-07
Payer: MEDICAID

## 2023-11-07 NOTE — TELEPHONE ENCOUNTER
----- Message from Margaret Madera sent at 11/7/2023 11:15 AM CST -----  Regarding: call back  Contact: [phone]  Who Called: PT     Patient is calling to talk to nurse in regards to see if her procedure can be rescheduled for this week. Please advice

## 2023-11-08 ENCOUNTER — PROCEDURE VISIT (OUTPATIENT)
Dept: OBSTETRICS AND GYNECOLOGY | Facility: CLINIC | Age: 38
End: 2023-11-08
Payer: MEDICAID

## 2023-11-08 VITALS — BODY MASS INDEX: 33.68 KG/M2 | SYSTOLIC BLOOD PRESSURE: 89 MMHG | WEIGHT: 196.19 LBS | DIASTOLIC BLOOD PRESSURE: 58 MMHG

## 2023-11-08 DIAGNOSIS — N93.9 ABNORMAL UTERINE BLEEDING (AUB): Primary | ICD-10-CM

## 2023-11-08 PROCEDURE — 99499 UNLISTED E&M SERVICE: CPT | Mod: S$PBB,,, | Performed by: STUDENT IN AN ORGANIZED HEALTH CARE EDUCATION/TRAINING PROGRAM

## 2023-11-08 PROCEDURE — 88305 TISSUE EXAM BY PATHOLOGIST: CPT | Mod: 26,,, | Performed by: PATHOLOGY

## 2023-11-08 PROCEDURE — 88305 TISSUE EXAM BY PATHOLOGIST: CPT | Performed by: PATHOLOGY

## 2023-11-08 PROCEDURE — 88305 TISSUE EXAM BY PATHOLOGIST: ICD-10-PCS | Mod: 26,,, | Performed by: PATHOLOGY

## 2023-11-08 PROCEDURE — 58100 ENDOMETRIAL BIOPSY: ICD-10-PCS | Mod: S$PBB,,, | Performed by: STUDENT IN AN ORGANIZED HEALTH CARE EDUCATION/TRAINING PROGRAM

## 2023-11-08 PROCEDURE — 99499 NO LOS: ICD-10-PCS | Mod: S$PBB,,, | Performed by: STUDENT IN AN ORGANIZED HEALTH CARE EDUCATION/TRAINING PROGRAM

## 2023-11-08 PROCEDURE — 58100 BIOPSY OF UTERUS LINING: CPT | Mod: PBBFAC,PO | Performed by: STUDENT IN AN ORGANIZED HEALTH CARE EDUCATION/TRAINING PROGRAM

## 2023-11-08 NOTE — PROCEDURES
Endometrial biopsy    Date/Time: 11/8/2023 3:15 PM    Performed by: Sydnee Thorpe MD  Authorized by: Sydnee Thorpe MD    Consent:     Consent obtained:  Written    Consent given by:  Patient    Patient questions answered: yes      Patient agrees, verbalizes understanding, and wants to proceed: yes    Indication:     Indications: Menorrhagia    Pre-procedure:     Pre-procedure timeout performed: yes    Procedure:     Procedure: endometrial biopsy with Pipelle      Cervix cleaned and prepped: yes      Uterus sounded: yes      Uterus sound depth (cm):  10    Curettes used:  3    Specimen collected: specimen collected and sent to pathology      Patient tolerated procedure well with no complications: yes

## 2023-11-13 LAB
FINAL PATHOLOGIC DIAGNOSIS: NORMAL
GROSS: NORMAL
Lab: NORMAL

## 2023-11-16 ENCOUNTER — OFFICE VISIT (OUTPATIENT)
Dept: PODIATRY | Facility: CLINIC | Age: 38
End: 2023-11-16
Payer: MEDICAID

## 2023-11-16 ENCOUNTER — TELEPHONE (OUTPATIENT)
Dept: PODIATRY | Facility: CLINIC | Age: 38
End: 2023-11-16

## 2023-11-16 VITALS — WEIGHT: 196.44 LBS | HEIGHT: 64 IN | BODY MASS INDEX: 33.54 KG/M2

## 2023-11-16 DIAGNOSIS — M79.672 BILATERAL FOOT PAIN: Primary | ICD-10-CM

## 2023-11-16 DIAGNOSIS — M72.2 PLANTAR FASCIITIS, BILATERAL: Primary | ICD-10-CM

## 2023-11-16 DIAGNOSIS — M21.861 ACQUIRED POSTERIOR EQUINUS OF BOTH LOWER EXTREMITIES: ICD-10-CM

## 2023-11-16 DIAGNOSIS — M79.671 BILATERAL FOOT PAIN: Primary | ICD-10-CM

## 2023-11-16 DIAGNOSIS — M21.862 ACQUIRED POSTERIOR EQUINUS OF BOTH LOWER EXTREMITIES: ICD-10-CM

## 2023-11-16 PROCEDURE — 99999 PR PBB SHADOW E&M-EST. PATIENT-LVL III: CPT | Mod: PBBFAC,,, | Performed by: PODIATRIST

## 2023-11-16 PROCEDURE — 99203 PR OFFICE/OUTPT VISIT, NEW, LEVL III, 30-44 MIN: ICD-10-PCS | Mod: S$PBB,,, | Performed by: PODIATRIST

## 2023-11-16 PROCEDURE — 3008F BODY MASS INDEX DOCD: CPT | Mod: CPTII,,, | Performed by: PODIATRIST

## 2023-11-16 PROCEDURE — 1159F PR MEDICATION LIST DOCUMENTED IN MEDICAL RECORD: ICD-10-PCS | Mod: CPTII,,, | Performed by: PODIATRIST

## 2023-11-16 PROCEDURE — 1160F PR REVIEW ALL MEDS BY PRESCRIBER/CLIN PHARMACIST DOCUMENTED: ICD-10-PCS | Mod: CPTII,,, | Performed by: PODIATRIST

## 2023-11-16 PROCEDURE — 99203 OFFICE O/P NEW LOW 30 MIN: CPT | Mod: S$PBB,,, | Performed by: PODIATRIST

## 2023-11-16 PROCEDURE — 3008F PR BODY MASS INDEX (BMI) DOCUMENTED: ICD-10-PCS | Mod: CPTII,,, | Performed by: PODIATRIST

## 2023-11-16 PROCEDURE — 99999 PR PBB SHADOW E&M-EST. PATIENT-LVL III: ICD-10-PCS | Mod: PBBFAC,,, | Performed by: PODIATRIST

## 2023-11-16 PROCEDURE — 99213 OFFICE O/P EST LOW 20 MIN: CPT | Mod: PBBFAC,PN | Performed by: PODIATRIST

## 2023-11-16 PROCEDURE — 1159F MED LIST DOCD IN RCRD: CPT | Mod: CPTII,,, | Performed by: PODIATRIST

## 2023-11-16 PROCEDURE — 1160F RVW MEDS BY RX/DR IN RCRD: CPT | Mod: CPTII,,, | Performed by: PODIATRIST

## 2023-11-16 RX ORDER — MELOXICAM 7.5 MG/1
7.5 TABLET ORAL DAILY
Qty: 30 TABLET | Refills: 1 | Status: SHIPPED | OUTPATIENT
Start: 2023-11-16 | End: 2024-01-31

## 2023-11-16 NOTE — PROGRESS NOTES
[facility]– [place]  [address] [facility]  [address]  [place] [address]  Dept: [phone]  Dept Fax: [phone]    Lico Segura Jr., DPMATEO     Assessment:   MDM    Coding  1. Plantar fasciitis, bilateral  meloxicam (MOBIC) 7.5 MG tablet      2. Acquired posterior equinus of both lower extremities            Plan:     Procedures    Janie was seen today for foot pain.    Diagnoses and all orders for this visit:    Plantar fasciitis, bilateral  -     meloxicam (MOBIC) 7.5 MG tablet; Take 1 tablet (7.5 mg total) by mouth once daily.    Acquired posterior equinus of both lower extremities        -pt seen, evaluated, and managed  -dx discussed in detail. All questions/concerns addressed  -all tx options discussed. All alternatives, risks, benefits of all txs discussed  -the patient was educated about the diagnosis  -We discussed conservative care options possible including but not limited to shoe wear and/or padding, bracing/strapping, at home ROM, formal PT, medical therapy, injection therapy  - The utilization of NSAIDs can be considered but their benefit has to be tempered against the risk of GI/ concerns  - A steroid injection can be undertaken.  We did discuss the potential mechanism of action of this shot.  Understanding that multiple injections at the same anatomic site do have deleterious effects on the soft tissue.  Generic risks include: steroid flare (advised to ice if necessary), skin hypo-pgimentation (which can be permanent and unsightly), elevation of blood sugar, subcutaneous atrophy (can be permanent) and infection.   -XR/imaging reviewed by me: agree with read  -labs reviewed by me: ok for mobic  -implemented icing/stretching regimen  -heel lifts dispensed        -rxs dispensed: mobic  -referrals: none  -WB: wbat      Follow up in about 5 weeks (around 12/21/2023).    Subjective:      Patient ID: Janie Barbosa is a 38 y.o. female.    Chief Complaint:   Chief Complaint    Patient presents with    Foot Pain     Bilateral        CC - foot pain: patient presents to the podiatry clinic  with complaint of  bilateral foot pain. Onset of the symptoms was several months ago. Precipitating event: unk. Current symptoms include: ability to bear weight, but with some pain, emerald the heel, swelling and worsening symptoms after a period of inactivity. Aggravating factors: walking and certain shoegear. Symptoms have gradually worsened. Patient has had no prior foot problems. Evaluation to date: none. Treatment to date: avoidance of offending activity. Patients rates pain 7/10 on pain scale.      HPI    Last Podiatry Enc: Visit date not found  Last Enc w/ Me: Visit date not found    Outside reports reviewed: historical medical records.  Family hx: as below  No past medical history on file.  Past Surgical History:   Procedure Laterality Date     SECTION      CYST REMOVAL      DILATION AND CURETTAGE OF UTERUS      ECTOPIC PREGNANCY SURGERY      SALPINGECTOMY Right     SINUS SURGERY      TUBAL LIGATION       Family History   Problem Relation Age of Onset    Breast cancer Neg Hx     Colon cancer Neg Hx     Ovarian cancer Neg Hx      Current Outpatient Medications   Medication Sig Dispense Refill    clotrimazole-betamethasone 1-0.05% (LOTRISONE) cream Apply topically 2 (two) times daily. 15 g 1    EPINEPHrine (EPIPEN) 0.3 mg/0.3 mL AtIn Inject into the muscle.      ergocalciferol (ERGOCALCIFEROL) 50,000 unit Cap Take 50,000 Units by mouth every 7 days.      etodolac (LODINE XL) 400 MG 24 hr tablet Take 400 mg by mouth daily as needed.      fluticasone propionate (FLONASE) 50 mcg/actuation nasal spray 1 spray (50 mcg total) by Each Nostril route 2 (two) times daily as needed for Rhinitis. 15 g 0    ibuprofen (ADVIL,MOTRIN) 600 MG tablet Take 1 tablet (600 mg total) by mouth every 6 (six) hours as needed for Pain. 20 tablet 0    fluticasone propionate (FLONASE) 50 mcg/actuation nasal spray 1 spray  (50 mcg total) by Each Nostril route 2 (two) times daily as needed for Rhinitis. (Patient not taking: Reported on 1/3/2023) 15 g 0    levocetirizine (XYZAL) 5 MG tablet Take 1 tablet (5 mg total) by mouth every evening. 30 tablet 0    medroxyPROGESTERone (PROVERA) 10 MG tablet Take 1 tablet (10 mg total) by mouth once daily. 30 tablet 3    meloxicam (MOBIC) 7.5 MG tablet Take 1 tablet (7.5 mg total) by mouth once daily. 30 tablet 1    terconazole (TERAZOL 7) 0.4 % Crea Place 1 applicator vaginally every evening. (Patient not taking: Reported on 4/27/2023) 7 g 0    terconazole (TERAZOL 7) 0.4 % Crea Place 1 applicator vaginally every evening. 7 g 0    terconazole (TERAZOL 7) 0.4 % Crea Place 1 applicator vaginally every evening. 7 g 0     No current facility-administered medications for this visit.     Review of patient's allergies indicates:   Allergen Reactions    Shellfish containing products Anaphylaxis and Swelling    Codeine Nausea And Vomiting     Social History     Socioeconomic History    Marital status: Single   Tobacco Use    Smoking status: Never    Smokeless tobacco: Never   Substance and Sexual Activity    Alcohol use: No    Drug use: No    Sexual activity: Yes     Partners: Male     Birth control/protection: See Surgical Hx     Social Determinants of Health     Financial Resource Strain: Low Risk  (3/28/2022)    Overall Financial Resource Strain (CARDIA)     Difficulty of Paying Living Expenses: Not very hard   Food Insecurity: No Food Insecurity (3/28/2022)    Hunger Vital Sign     Worried About Running Out of Food in the Last Year: Never true     Ran Out of Food in the Last Year: Never true   Transportation Needs: No Transportation Needs (3/28/2022)    PRAPARE - Transportation     Lack of Transportation (Medical): No     Lack of Transportation (Non-Medical): No   Physical Activity: Sufficiently Active (3/28/2022)    Exercise Vital Sign     Days of Exercise per Week: 7 days     Minutes of Exercise per  Session: 150+ min   Stress: No Stress Concern Present (3/28/2022)    [employer] - Occupational Stress Questionnaire     Feeling of Stress : Only a little   Social Connections: Socially Isolated (3/28/2022)    Social Connection and Isolation Panel [NHANES]     Frequency of Communication with Friends and Family: More than three times a week     Frequency of Social Gatherings with Friends and Family: More than three times a week     Attends [employer] Services: Never     Active Member of Clubs or Organizations: No     Attends Club or Organization Meetings: Never     Marital Status: Never    Housing Stability: Unknown (3/28/2022)    Housing Stability Vital Sign     Unable to Pay for Housing in the Last Year: No     Unstable Housing in the Last Year: No       ROS    REVIEW OF SYSTEMS: Negative as documented below as well as positive findings in bold.       Constitutional  Respiratory  Gastrointestinal  Skin   - Fever - Cough - Heartburn - Rash   - Chills - Spit blood - Nausea - Itching   - Weight Loss - Shortness of breath - Vomiting - Nail pain   - Malaise/Fatigue - Wheezing - Abdominal Pain  Wound/Ulcer   - Weight Gain   - Blood in Stool  Poor wound healing       - Diarrhea          Cardiovascular  Genitourinary  Neurological  HEENT   - Chest Pain - Dysuria - Burning Sensation of feet - Headache   - Palpitations - Hematuria - Tingling / Paresthesia - Congestion   - Pain at night in legs - Flank Pain - Dizziness - Sore Throat   - Cramping   - Tremor - Blurred Vision   - Leg Swelling   - Sensory Change - Double Vision   - Dizzy when standing   - Speech Change - Eye Redness       - Focal Weakness - Dry Eyes       - Loss of Consciousness          Endocrine  Musculoskeletal  Psychiatric   - Cold intolerance - Muscle Pain - Depression   - Heat intolerance - Neck Pain - Insomnia   - Anemia - Joint Pain - Memory Loss   -  Easy bruising, bleeding - Heel pain - Anxiety      Toe Pain         "Leg/Ankle/Foot Pain         Objective:     Ht 5' 4" (1.626 m)   Wt 89.1 kg (196 lb 6.9 oz)   LMP 10/21/2023 (Approximate)   BMI 33.72 kg/m²   Vitals:    11/16/23 0846   Weight: 89.1 kg (196 lb 6.9 oz)   Height: 5' 4" (1.626 m)   PainSc:   2   PainLoc: Foot       Physical Exam    General Appearance:   Patient appears well developed, well nourished  Patient appears stated age    Psychiatric:   Patient is oriented to time, place, and person.  Patient has appropriate mood and affect    Neck:  Trachea Midline  No visible masses    Respiratory/Ears:  No distress or labored breathing.  Able to differentiate between normal talking voice and whisper.  Able to follow commands    Eyes:  Visual Acuity intact  Lids and conjunctivae normal. No discoloration noted.    Foot Exam  Physical Exam  Ortho Exam  Ortho/SPM Exam  Foot/Ankle Musculoskeletal Exam    B/l LE exam con't:  V:  DP 2/4, PT 2/4   CRT< 3s to all digits tested   Tibial and popliteal lymph nodes are w/o abnormality   Edema: absent, varicosities: absent    N:  Patient displays normal ankle reflexes   SILT in SP/DP/T/Joyce/Saph distributions    Ortho: +Motor EHL/FHL/TA/GA   equinus deformity present   Pes planus noted  There is moderate pain with palpation of b/l medial calcaneal tubercle  Compartments soft/compressible. No pain on passive stretch of big toe. No calf  Pain.    Derm:  skin intact, skin warm and dry, skin without ulcers or lesions, skin without induration, nails normal, texture turgor well hydrated      Imaging / Labs:      XR Weight Bearing Foot Complete 3+V Bilateral    Result Date: 11/16/2023  EXAM:  XR WEIGHT BEARING FOOT COMPLETE 3+ VIEW BILATERAL INDICATIONS:  Bilateral foot pain TECHNIQUE:  6 total views of the bilateral feet COMPARISONS:  None available. FINDINGS:  No fractures nor dislocations and no significant degenerative joint disease present in either foot.  The alignment is normal.  No joint effusions and no foreign bodies.      Normal " bilateral feet Finalized on: 11/16/2023 8:38 AM By:  Zac Hernandez MD [facility]# [id number]      2023-11-16 08:40:[id number]    JESUS        Note: This was dictated using a computer transcription program. Although proofread, it may contain computer transcription errors and phonetic errors. Other human proofreading errors may also exist. Corrections may be performed at a later time. Please contact us for any clarification if needed.    Lico Segura DPM  [facility]

## 2023-11-16 NOTE — PATIENT INSTRUCTIONS
Heel Pain (Plantar Fasciitis)        Heel pain is most often caused by plantar fasciitis, a condition that is sometimes also called heel spur syndrome when a spur is present. Heel pain may also be due to other causes, such as a stress fracture, tendonitis, arthritis, nerve irritation or, rarely, a cyst.    Because there are several potential causes, it is important to have heel pain properly diagnosed. A foot and ankle surgeon is able to distinguish between all the possibilities and to determine the underlying source of your heel pain.    What Is Plantar Fasciitis?  Heel pain is often caused by plantar fasciitis  Plantar fasciitis is an inflammation of the band of tissue (the plantar fascia) that extends from the heel to the toes. In this condition, the fascia first becomes irritated and then inflamed, resulting in heel pain.    Causes  The most common cause of plantar fasciitis relates to faulty structure of the foot. For example, people who have problems with their arches, either overly flat feet or high-arched feet, are more prone to developing plantar fasciitis.    Wearing nonsupportive footwear on hard, flat surfaces puts abnormal strain on the plantar fascia and can also lead to plantar fasciitis. This is particularly evident when ones job requires long hours on the feet. Obesity and overuse may also contribute to plantar fasciitis.    Symptoms  The symptoms of plantar fasciitis are:    Pain on the bottom of the heel  Pain in the arch of the foot  Pain that is usually worse upon arising  Pain that increases over a period of months  Swelling on the bottom of the heel     People with plantar fasciitis often describe the pain as worse when they get up in the morning or after they have been sitting for long periods of time. After a few minutes of walking, the pain decreases because walking stretches the fascia. For some people, the pain subsides but returns after spending long periods of time on their  feet.    Diagnosis  To arrive at a diagnosis, the foot and ankle surgeon will obtain your medical history and examine your foot. Throughout this process, the surgeon rules out all possible causes for your heel pain other than plantar fasciitis.    In addition, diagnostic imaging studies, such as x-rays or other imaging modalities, may be used to distinguish the different types of heel pain. Sometimes heel spurs are found in patients with plantar fasciitis, but these are rarely a source of pain. When they are present, the condition may be diagnosed as plantar fasciitis/heel spur syndrome.    Nonsurgical Treatment  Treatment of plantar fasciitis begins with first-line strategies, which you can begin at home:    -Stretching exercises. Exercises that stretch out the calf muscles help ease pain and assist with recovery.  -Avoid going barefoot. When you walk without shoes, you put undue strain and stress on your plantar fascia.  -Ice. Putting an ice pack on your heel for 20 minutes several times a day helps reduce inflammation. Place a thin towel between the ice and your heel; do not apply ice directly to the skin.  -Limit activities. Cut down on extended physical activities to give your heel a rest.  -Shoe modifications. Wearing supportive shoes that have good arch support and a slightly raised heel reduces stress on the plantar fascia.  -Medications. Oral nonsteroidal anti-inflammatory drugs (NSAIDs), such as ibuprofen, may be recommended to reduce pain and inflammation.     If you still have pain after several weeks, see your foot and ankle surgeon, who may add one or more of these treatment approaches:    -Padding, taping and strapping. Placing pads in the shoe softens the impact of walking. Taping and strapping help support the foot and reduce strain on the fascia.  -Orthotic devices. Custom orthotic devices that fit into your shoe help correct the underlying structural abnormalities causing the plantar  fasciitis.  -Injection therapy. In some cases, corticosteroid injections are used to help reduce the inflammation and relieve pain.  -Removable walking cast. A removable walking cast may be used to keep your foot immobile for a few weeks to allow it to rest and heal.  -Night splint. Wearing a night splint allows you to maintain an extended stretch of the plantar fascia while sleeping. This may help reduce the morning pain experienced by some patients.  -Physical therapy. Exercises and other physical therapy measures may be used to help provide relief.     When Is Surgery Needed?  Although most patients with plantar fasciitis respond to nonsurgical treatment, a small percentage of patients may require surgery. If, after several months of nonsurgical treatment, you continue to have heel pain, surgery will be considered. Your foot and ankle surgeon will discuss the surgical options with you and determine which approach would be most beneficial for you.    Long-Term Care  No matter what kind of treatment you undergo for plantar fasciitis, the underlying causes that led to this condition may remain. Therefore, you will need to continue with preventive measures. Wearing supportive shoes, stretching and using custom orthotic devices are the mainstay of long-term treatment for plantar fasciitis.            Understanding Heel Pain  Your heel is the back part of your foot. A band of tissue called the plantar fascia connects the heel bone to the bones in the ball of your foot. Nerves run from the heel up the inside of your ankle and into your leg. When you feel pain in the bottom of your heel, the plantar fascia may be inflamed. Overuse, Achilles tightness, or excess body weight can cause the tissue to tear or pull away from the bone. Sometimes the inflamed plantar fascia also irritates a nerve, causing more pain.    What causes heel pain?  Wearing shoes with poor cushioning can irritate the tissue in your heel (plantar  fascia). Being overweight or standing for long periods can also irritate the tissue. Running, walking, tennis, and other sports that put stress on the heels can cause tiny tears in the tissue. If your lower leg muscles are tight, this is more likely to occur. A tight Achilles tendon will also contribute to heel pain.  Symptoms  You may feel pain on the bottom or on the inside edge of your heel. The pain may be sharp when you get out of bed or when you stand up after sitting for a while. You may feel a dull ache in your heel after youve been standing for a long time on a hard surface. Running can also cause a dull ache.  Preventing future problems  To prevent future heel pain, wear shoes with well-cushioned heels. And do exercises prescribed by your healthcare provider to stretch the plantar fascia and the muscles in the lower leg.   Date Last Reviewed: 9/10/2015  © 9451-0634 [employer]. [address] [place] [address]. All rights reserved. This information is not intended as a substitute for professional medical care. Always follow your healthcare professional's instructions.      Treating Plantar Fasciitis  First, your healthcare provider tries to determine the cause of your problem in order to suggest ways to relieve pain. If your pain is due to poor foot mechanics, custom-made shoe inserts (orthoses) may help.    Reduce symptoms  To relieve mild symptoms, try aspirin, ibuprofen, or other medicines as directed. Rubbing ice on the affected area may also help.  To reduce severe pain and swelling, your healthcare provider may prescribe pills or injections or a walking cast in some instances. Physical therapy, such as ultrasound or a daily stretching program, may also be recommended. Surgery is rarely required.  To reduce symptoms caused by poor foot mechanics, your foot may be taped. This supports the arch and temporarily controls movement. Night splints may also help by stretching the  fascia.  Control movement  If taping helps, your healthcare provider may prescribe orthoses. Built from plaster casts of your feet, these inserts control the way your foot moves. As a result, your symptoms should go away.  Reduce overuse  Every time your foot strikes the ground, the plantar fascia is stretched. You can reduce the strain on the plantar fascia and the possibility of overuse by following these suggestions:  Lose any excess weight.  Avoid running on hard or uneven ground.  Use orthoses at all times in your shoes and house slippers.  If surgery is needed  Your healthcare provider may consider surgery if other types of treatment don't control your pain. During surgery, the plantar fascia is partially cut to release tension. As you heal, fibrous tissue fills the space between the heel bone and the plantar fascia.   Date Last Reviewed: 10/14/2015  © 6079-7825 [employer]. [address] [place] [address]. All rights reserved. This information is not intended as a substitute for professional medical care. Always follow your healthcare professional's instructions.      Equinus          What Is Equinus?    Equinus is a condition in which the upward bending motion of the ankle joint is limited. Someone with equinus lacks the flexibility to bring the top of the foot toward the front of the leg. Equinus can occur in one or both feet. When it involves both feet, the limitation of motion is sometimes worse in one foot than in the other.    People with equinus develop ways to compensate for their limited ankle motion, and this often leads to other foot, leg or back problems. The most common methods of compensation are flattening of the arch or picking up the heel early when walking, placing increased pressure on the ball of the foot. Other patients compensate by toe walking, while a smaller number take steps by bending abnormally at the hip or knee.    Causes  There are several possible causes  for the limited range of ankle motion. Often, it is due to tightness in the Achilles tendon or calf muscles (the soleus muscle and/or gastrocnemius muscle). In some patients, this tightness is congenital (present at birth), and sometimes it is an inherited trait. Other patients acquire the tightness from being in a cast, being on crutches or frequently wearing high-heeled shoes. In addition, diabetes can affect the fibers of the Achilles tendon and cause tightness. Sometimes equinus is related to a bone blocking the ankle motion. For example, a fragment of a broken bone following an ankle injury, or bone block, can get in the way and restrict motion. Equinus may also result from one leg being shorter than the other. Less often, equinus is caused by spasms in the calf muscle. These spasms may be signs of an underlying neurologic disorder.      Foot Problems Related to Equinus  Depending on how a patient compensates for the inability to bend properly at the ankle, a variety of foot conditions can develop, including:    Plantar fasciitis (arch/heel pain)  Calf cramping  Tendonitis (inflammation in the Achilles tendon)  Metatarsalgia (pain and/or callusing on the ball of the foot)  Flatfoot  Arthritis of the midfoot (middle area of the foot)  Pressure sores on the ball of the foot or the arch  Bunions and hammertoes  Ankle pain  Shin splints     Diagnosis  Most patients with equinus are unaware they have this condition when they first visit the doctor. Instead, they come to the doctor seeking relief for foot problems associated with equinus.    To diagnose equinus, the foot and ankle surgeon will evaluate the ankle's range of motion when the knee is flexed (bent) as well as extended (straightened). This enables the surgeon to identify whether the tendon or muscle is tight and to assess whether bone is interfering with ankle motion. X-rays may also be ordered. In some cases, the foot and ankle surgeon may refer the  patient for neurologic evaluation.    Nonsurgical Treatment  Treatment includes strategies aimed at relieving the symptoms and conditions associated with equinus. In addition, the patient is treated for the equinus itself through one or more of the following options:    Night splint. The foot may be placed in a splint at night to keep it in a position that helps reduce tightness of the calf muscle.  Heel lifts. Placing heel lifts inside the shoes or wearing shoes with a moderate heel takes stress off the Achilles tendon when walking and may reduce symptoms.  Arch supports or orthotic devices. Custom orthotic devices that fit into the shoe are often prescribed to keep weight distributed properly and to help control muscle/tendon imbalance.  Physical therapy. To help remedy muscle tightness, exercises that stretch the calf muscle(s) are recommended.     When Is Surgery Needed?  In some cases, surgery may be needed to correct the cause of equinus if it is related to a tight tendon or a bone blocking the ankle motion. The foot and ankle surgeon will determine the type of procedure that is best suited to the individual patient.                Ankle Dorsiflexion/Plantarflexion (Flexibility)    Sit on the floor or in bed with your legs straight in front of you.  Point both feet. Then flex both feet.  Do this 10 to 30 times in a row.  Repeat this exercise 2 times a day, or as instructed.  Date Last Reviewed: 5/1/2016 © 2000-2016 [employer]. [address] [place] [address]. All rights reserved. This information is not intended as a substitute for professional medical care. Always follow your healthcare professional's instructions.          Arch retraining    These exercises are for your right foot. Switch sides for your left foot.  Sit in a chair or stand with both feet flat on the floor. Press down with the ball of your right foot, but only on the left side of the foot, just under the big  toe.  Then pull the bottom of your big toe back toward your heel. This should pull up the arch of your foot. Dont flex your toes while doing this. It is a subtle movement of the arch.  Hold for 5 seconds. Relax.  Date Last Reviewed: 3/10/2016  © 0680-4102 [employer]. [address] [place] [address]. All rights reserved. This information is not intended as a substitute for professional medical care. Always follow your healthcare professional's instructions.        Soleus Stretch (Flexibility)    Stand facing a wall from 3 feet away. Take one step toward the wall with your right foot.  Place both palms on the wall. Bend both knees and lean forward. Keep both heels on the floor.  Hold for 30 to 60 seconds. Then relax both legs. Repeat the exercise 2 times.  Switch legs and repeat.  Repeat this exercise 3 times a day, or as instructed.     Tip: Dont bounce while youre stretching.   Date Last Reviewed: 3/10/2016  © 6111-1857 [employer]. [address] [place] [address]. All rights reserved. This information is not intended as a substitute for professional medical care. Always follow your healthcare professional's instructions.          Recommended OTC orthotics:  -powerstep  -superfeet    Recommended shoegear:  -new balance  -ascics  -abraham goins

## 2023-11-17 ENCOUNTER — PATIENT MESSAGE (OUTPATIENT)
Dept: OBSTETRICS AND GYNECOLOGY | Facility: CLINIC | Age: 38
End: 2023-11-17
Payer: MEDICAID

## 2023-11-21 ENCOUNTER — OFFICE VISIT (OUTPATIENT)
Dept: OBSTETRICS AND GYNECOLOGY | Facility: CLINIC | Age: 38
End: 2023-11-21
Payer: MEDICAID

## 2023-11-21 VITALS — SYSTOLIC BLOOD PRESSURE: 92 MMHG | DIASTOLIC BLOOD PRESSURE: 70 MMHG | WEIGHT: 194.44 LBS | BODY MASS INDEX: 33.38 KG/M2

## 2023-11-21 DIAGNOSIS — Z01.818 PREOP EXAMINATION: Primary | ICD-10-CM

## 2023-11-21 DIAGNOSIS — N93.9 ABNORMAL UTERINE BLEEDING (AUB): ICD-10-CM

## 2023-11-21 PROCEDURE — 1160F PR REVIEW ALL MEDS BY PRESCRIBER/CLIN PHARMACIST DOCUMENTED: ICD-10-PCS | Mod: CPTII,,, | Performed by: STUDENT IN AN ORGANIZED HEALTH CARE EDUCATION/TRAINING PROGRAM

## 2023-11-21 PROCEDURE — 3008F PR BODY MASS INDEX (BMI) DOCUMENTED: ICD-10-PCS | Mod: CPTII,,, | Performed by: STUDENT IN AN ORGANIZED HEALTH CARE EDUCATION/TRAINING PROGRAM

## 2023-11-21 PROCEDURE — 99214 PR OFFICE/OUTPT VISIT, EST, LEVL IV, 30-39 MIN: ICD-10-PCS | Mod: S$PBB,,, | Performed by: STUDENT IN AN ORGANIZED HEALTH CARE EDUCATION/TRAINING PROGRAM

## 2023-11-21 PROCEDURE — 3074F PR MOST RECENT SYSTOLIC BLOOD PRESSURE < 130 MM HG: ICD-10-PCS | Mod: CPTII,,, | Performed by: STUDENT IN AN ORGANIZED HEALTH CARE EDUCATION/TRAINING PROGRAM

## 2023-11-21 PROCEDURE — 1159F MED LIST DOCD IN RCRD: CPT | Mod: CPTII,,, | Performed by: STUDENT IN AN ORGANIZED HEALTH CARE EDUCATION/TRAINING PROGRAM

## 2023-11-21 PROCEDURE — 99214 OFFICE O/P EST MOD 30 MIN: CPT | Mod: PBBFAC,PO | Performed by: STUDENT IN AN ORGANIZED HEALTH CARE EDUCATION/TRAINING PROGRAM

## 2023-11-21 PROCEDURE — 1160F RVW MEDS BY RX/DR IN RCRD: CPT | Mod: CPTII,,, | Performed by: STUDENT IN AN ORGANIZED HEALTH CARE EDUCATION/TRAINING PROGRAM

## 2023-11-21 PROCEDURE — 3008F BODY MASS INDEX DOCD: CPT | Mod: CPTII,,, | Performed by: STUDENT IN AN ORGANIZED HEALTH CARE EDUCATION/TRAINING PROGRAM

## 2023-11-21 PROCEDURE — 3078F PR MOST RECENT DIASTOLIC BLOOD PRESSURE < 80 MM HG: ICD-10-PCS | Mod: CPTII,,, | Performed by: STUDENT IN AN ORGANIZED HEALTH CARE EDUCATION/TRAINING PROGRAM

## 2023-11-21 PROCEDURE — 1159F PR MEDICATION LIST DOCUMENTED IN MEDICAL RECORD: ICD-10-PCS | Mod: CPTII,,, | Performed by: STUDENT IN AN ORGANIZED HEALTH CARE EDUCATION/TRAINING PROGRAM

## 2023-11-21 PROCEDURE — 99999 PR PBB SHADOW E&M-EST. PATIENT-LVL IV: CPT | Mod: PBBFAC,,, | Performed by: STUDENT IN AN ORGANIZED HEALTH CARE EDUCATION/TRAINING PROGRAM

## 2023-11-21 PROCEDURE — 3078F DIAST BP <80 MM HG: CPT | Mod: CPTII,,, | Performed by: STUDENT IN AN ORGANIZED HEALTH CARE EDUCATION/TRAINING PROGRAM

## 2023-11-21 PROCEDURE — 99214 OFFICE O/P EST MOD 30 MIN: CPT | Mod: S$PBB,,, | Performed by: STUDENT IN AN ORGANIZED HEALTH CARE EDUCATION/TRAINING PROGRAM

## 2023-11-21 PROCEDURE — 3074F SYST BP LT 130 MM HG: CPT | Mod: CPTII,,, | Performed by: STUDENT IN AN ORGANIZED HEALTH CARE EDUCATION/TRAINING PROGRAM

## 2023-11-21 PROCEDURE — 99999 PR PBB SHADOW E&M-EST. PATIENT-LVL IV: ICD-10-PCS | Mod: PBBFAC,,, | Performed by: STUDENT IN AN ORGANIZED HEALTH CARE EDUCATION/TRAINING PROGRAM

## 2023-11-21 NOTE — PROGRESS NOTES
History & Physical  Gynecology      SUBJECTIVE:     Chief Complaint: PreOp Exam       History of Present Illness:  John is a 38 yr old  who presents to clinic for a preoperative exam for a hysteroscopy/D&C with endometrial ablation. This is a patient who was previously seen by Dr. Gary in the past who planned for an endometrial ablation in an effort to manage menorrhagia. Following his departure from clinic out clinic, she was lost to follow-up. Since she her in clinic for the first time, I completed endometrial sampling and reviewed her prior work-up. She continues to desire the endometrial ablation.     TVUS:   FINDINGS:  Uterus is enlarged measuring 10.9 x 4.1 x 5.1 cm.  The endometrial thickness is 6 mm.  Nine and 15 mm intramural fibroids are seen.     Right ovary is is surgically absent.    Left ovary is normal and measures 3 x 1.9 x 3.1 cm. 1.9 cm follicle or cyst seen within the left ovary.  Flow is demonstrated within the left ovary.     There is no free pelvic fluid.     Impression:     See above.      EMBX Pathology:   Fragments of early secretory endometrium, no atypical hyperplasia or malignancy identified.     Review of patient's allergies indicates:   Allergen Reactions    Shellfish containing products Anaphylaxis and Swelling    Codeine Nausea And Vomiting       History reviewed. No pertinent past medical history.  Past Surgical History:   Procedure Laterality Date     SECTION      CYST REMOVAL      DILATION AND CURETTAGE OF UTERUS      ECTOPIC PREGNANCY SURGERY      SALPINGECTOMY Right     SINUS SURGERY      TUBAL LIGATION       OB History          6    Para   4    Term   4            AB   2    Living   4         SAB        IAB        Ectopic        Multiple        Live Births   4               Family History   Problem Relation Age of Onset    Breast cancer Neg Hx     Colon cancer Neg Hx     Ovarian cancer Neg Hx      Social History     Tobacco Use    Smoking status:  Never    Smokeless tobacco: Never   Substance Use Topics    Alcohol use: No    Drug use: No       Current Outpatient Medications   Medication Sig    clotrimazole-betamethasone 1-0.05% (LOTRISONE) cream Apply topically 2 (two) times daily.    EPINEPHrine (EPIPEN) 0.3 mg/0.3 mL AtIn Inject into the muscle.    ergocalciferol (ERGOCALCIFEROL) 50,000 unit Cap Take 50,000 Units by mouth every 7 days.    etodolac (LODINE XL) 400 MG 24 hr tablet Take 400 mg by mouth daily as needed.    fluticasone propionate (FLONASE) 50 mcg/actuation nasal spray 1 spray (50 mcg total) by Each Nostril route 2 (two) times daily as needed for Rhinitis.    ibuprofen (ADVIL,MOTRIN) 600 MG tablet Take 1 tablet (600 mg total) by mouth every 6 (six) hours as needed for Pain.    meloxicam (MOBIC) 7.5 MG tablet Take 1 tablet (7.5 mg total) by mouth once daily.    fluticasone propionate (FLONASE) 50 mcg/actuation nasal spray 1 spray (50 mcg total) by Each Nostril route 2 (two) times daily as needed for Rhinitis. (Patient not taking: Reported on 1/3/2023)    levocetirizine (XYZAL) 5 MG tablet Take 1 tablet (5 mg total) by mouth every evening.    medroxyPROGESTERone (PROVERA) 10 MG tablet Take 1 tablet (10 mg total) by mouth once daily.    terconazole (TERAZOL 7) 0.4 % Crea Place 1 applicator vaginally every evening. (Patient not taking: Reported on 4/27/2023)    terconazole (TERAZOL 7) 0.4 % Crea Place 1 applicator vaginally every evening.    terconazole (TERAZOL 7) 0.4 % Crea Place 1 applicator vaginally every evening.     No current facility-administered medications for this visit.       Review of Systems:  Review of Systems   Constitutional:  Negative for chills, fatigue and fever.   HENT:  Negative for congestion.    Eyes:  Negative for visual disturbance.   Respiratory:  Negative for cough and shortness of breath.    Cardiovascular:  Negative for chest pain and palpitations.   Gastrointestinal:  Negative for abdominal distention, abdominal pain,  constipation, diarrhea, nausea and vomiting.   Genitourinary:  Positive for menstrual problem. Negative for difficulty urinating, dysuria, hematuria, vaginal bleeding and vaginal discharge.   Skin:  Negative for rash.   Neurological:  Negative for dizziness, seizures, light-headedness and headaches.   Hematological:  Does not bruise/bleed easily.   Psychiatric/Behavioral:  Negative for dysphoric mood. The patient is not nervous/anxious.         OBJECTIVE:     Physical Exam:  Vitals:    11/21/23 1002   BP: 92/70      Physical Exam  Vitals and nursing note reviewed.   Constitutional:       General: She is not in acute distress.     Appearance: She is well-developed.   HENT:      Head: Normocephalic and atraumatic.   Eyes:      Pupils: Pupils are equal, round, and reactive to light.   Cardiovascular:      Rate and Rhythm: Normal rate and regular rhythm.   Pulmonary:      Effort: Pulmonary effort is normal. No respiratory distress.   Abdominal:      General: There is no distension.      Palpations: Abdomen is soft. There is no mass.      Tenderness: There is no abdominal tenderness. There is no guarding.   Musculoskeletal:         General: Normal range of motion.      Cervical back: Normal range of motion and neck supple.   Skin:     General: Skin is warm and dry.   Neurological:      Mental Status: She is alert and oriented to person, place, and time.   Psychiatric:         Behavior: Behavior normal.         Thought Content: Thought content normal.         Judgment: Judgment normal.         ASSESSMENT/PLAN:     1. Preop examination  2. Abnormal uterine bleeding (AUB)  - Imaging and pathology as above  - Patient was thoroughly counseled on the possible and expected outcomes of the procedure  - She was counseled on the R/B/As of planned procedure and wishes to proceed  - Consents signed  - Case request placed  - Surgical coordinator contacted      Sydnee Thorpe M.D.  OB/GYN  [facility]

## 2023-12-21 ENCOUNTER — OFFICE VISIT (OUTPATIENT)
Dept: PODIATRY | Facility: CLINIC | Age: 38
End: 2023-12-21
Payer: MEDICAID

## 2023-12-21 VITALS — WEIGHT: 193.31 LBS | BODY MASS INDEX: 33 KG/M2 | RESPIRATION RATE: 18 BRPM | HEIGHT: 64 IN

## 2023-12-21 DIAGNOSIS — M21.861 ACQUIRED POSTERIOR EQUINUS OF BOTH LOWER EXTREMITIES: ICD-10-CM

## 2023-12-21 DIAGNOSIS — M72.2 PLANTAR FASCIITIS, BILATERAL: Primary | ICD-10-CM

## 2023-12-21 DIAGNOSIS — M21.862 ACQUIRED POSTERIOR EQUINUS OF BOTH LOWER EXTREMITIES: ICD-10-CM

## 2023-12-21 PROCEDURE — 99999 PR PBB SHADOW E&M-EST. PATIENT-LVL IV: CPT | Mod: PBBFAC,,, | Performed by: PODIATRIST

## 2023-12-21 PROCEDURE — 99214 OFFICE O/P EST MOD 30 MIN: CPT | Mod: PBBFAC,PN | Performed by: PODIATRIST

## 2023-12-21 PROCEDURE — 99213 OFFICE O/P EST LOW 20 MIN: CPT | Mod: S$PBB,,, | Performed by: PODIATRIST

## 2023-12-21 PROCEDURE — 3008F BODY MASS INDEX DOCD: CPT | Mod: CPTII,,, | Performed by: PODIATRIST

## 2023-12-21 RX ADMIN — DEXAMETHASONE SODIUM PHOSPHATE 4 MG: 4 INJECTION, SOLUTION INTRA-ARTICULAR; INTRALESIONAL; INTRAMUSCULAR; INTRAVENOUS; SOFT TISSUE at 08:12

## 2023-12-21 NOTE — PROGRESS NOTES
[facility]  [address] [facility]  [address]  [facility] [address]  Dept: [phone]  Dept Fax: [phone]    Lico Segura Jr., DPM     Assessment:   MDM    Coding  1. Plantar fasciitis, bilateral  Ambulatory referral/consult to Physical/Occupational Therapy    Tendon Sheath      2. Acquired posterior equinus of both lower extremities  Ambulatory referral/consult to Physical/Occupational Therapy          Plan:     Tendon Sheath    Date/Time: 12/21/2023 8:30 AM    Performed by: Lico Segura Jr., DPM  Authorized by: Lico Segura Jr., DPM    Consent Done?:  Yes (Verbal)  Indications:  Pain  Site marked: the procedure site was marked    Timeout: prior to procedure the correct patient, procedure, and site was verified    Prep: patient was prepped and draped in usual sterile fashion      Local anesthesia used?: Yes    Anesthesia:  Local infiltration  Local anesthetic:  Co-phenylcaine spray and bupivacaine 0.5% without epinephrine  Anesthetic total (ml):  2    Location:  Foot  Foot joint: L PF.  Ultrasonic guidance for needle placement?: No    Needle size:  25 G  Approach:  Medial  Medications:  4 mg dexAMETHasone 4 mg/mL  Patient tolerance:  Patient tolerated the procedure well with no immediate complications      Janie was seen today for plantar fasciitis.    Diagnoses and all orders for this visit:    Plantar fasciitis, bilateral  -     Ambulatory referral/consult to Physical/Occupational Therapy; Future  -     Tendon Sheath    Acquired posterior equinus of both lower extremities  -     Ambulatory referral/consult to Physical/Occupational Therapy; Future        -pt seen, evaluated, and managed  -dx discussed in detail. All questions/concerns addressed  -all tx options discussed. All alternatives, risks, benefits of all txs discussed  -the patient was educated about the diagnosis  -We discussed conservative care options possible including but not limited to shoe wear and/or  padding, bracing/strapping, at home ROM, formal PT, medical therapy, injection therapy  - The utilization of NSAIDs can be considered but their benefit has to be tempered against the risk of GI/ concerns  - A steroid injection can be undertaken.  We did discuss the potential mechanism of action of this shot.  Understanding that multiple injections at the same anatomic site do have deleterious effects on the soft tissue.  Generic risks include: steroid flare (advised to ice if necessary), skin hypo-pgimentation (which can be permanent and unsightly), elevation of blood sugar, subcutaneous atrophy (can be permanent) and infection.   -XR/imaging reviewed by me: agree with read  -labs reviewed by me: ok for mobic  -implemented icing/stretching regimen  -heel lifts dispensed    -rxs dispensed: none  -referrals: PT  -WB: wbat      Follow up in about 8 weeks (around 2/15/2024).    Subjective:      Patient ID: Janie Barbosa is a 38 y.o. female.    Chief Complaint:   Chief Complaint   Patient presents with    Plantar Fasciitis     Bilateral        CC - foot pain: patient presents to the podiatry clinic  with complaint of  bilateral foot pain. Onset of the symptoms was several months ago. Precipitating event: unk. Current symptoms include: ability to bear weight, but with some pain, emerald the heel, swelling and worsening symptoms after a period of inactivity. Aggravating factors: walking and certain shoegear. Symptoms have gradually worsened. Patient has had no prior foot problems. Evaluation to date: none. Treatment to date: avoidance of offending activity. Patients rates pain 7/10 on pain scale.      12/21/23:  Hx as above. Mobic didn't help that much. Still in pain L foot only.        Last Podiatry Enc: Visit date not found  Last Enc w/ Me: Visit date not found    Outside reports reviewed: historical medical records.  Family hx: as below  No past medical history on file.  Past Surgical History:   Procedure Laterality  Date     SECTION      CYST REMOVAL      DILATION AND CURETTAGE OF UTERUS      ECTOPIC PREGNANCY SURGERY      SALPINGECTOMY Right     SINUS SURGERY      TUBAL LIGATION       Family History   Problem Relation Age of Onset    Breast cancer Neg Hx     Colon cancer Neg Hx     Ovarian cancer Neg Hx      Current Outpatient Medications   Medication Sig Dispense Refill    clotrimazole-betamethasone 1-0.05% (LOTRISONE) cream Apply topically 2 (two) times daily. 15 g 1    EPINEPHrine (EPIPEN) 0.3 mg/0.3 mL AtIn Inject into the muscle.      ergocalciferol (ERGOCALCIFEROL) 50,000 unit Cap Take 50,000 Units by mouth every 7 days.      etodolac (LODINE XL) 400 MG 24 hr tablet Take 400 mg by mouth daily as needed.      fluticasone propionate (FLONASE) 50 mcg/actuation nasal spray 1 spray (50 mcg total) by Each Nostril route 2 (two) times daily as needed for Rhinitis. 15 g 0    ibuprofen (ADVIL,MOTRIN) 600 MG tablet Take 1 tablet (600 mg total) by mouth every 6 (six) hours as needed for Pain. 20 tablet 0    meloxicam (MOBIC) 7.5 MG tablet Take 1 tablet (7.5 mg total) by mouth once daily. 30 tablet 1    fluticasone propionate (FLONASE) 50 mcg/actuation nasal spray 1 spray (50 mcg total) by Each Nostril route 2 (two) times daily as needed for Rhinitis. (Patient not taking: Reported on 1/3/2023) 15 g 0    levocetirizine (XYZAL) 5 MG tablet Take 1 tablet (5 mg total) by mouth every evening. 30 tablet 0    medroxyPROGESTERone (PROVERA) 10 MG tablet Take 1 tablet (10 mg total) by mouth once daily. 30 tablet 3    terconazole (TERAZOL 7) 0.4 % Crea Place 1 applicator vaginally every evening. (Patient not taking: Reported on 2023) 7 g 0    terconazole (TERAZOL 7) 0.4 % Crea Place 1 applicator vaginally every evening. 7 g 0    terconazole (TERAZOL 7) 0.4 % Crea Place 1 applicator vaginally every evening. 7 g 0     No current facility-administered medications for this visit.     Review of patient's allergies indicates:   Allergen  Reactions    Codeine Nausea And Vomiting and Nausea Only    Hydrocodone-acetaminophen Nausea Only    Shellfish containing products Anaphylaxis and Swelling    Shellfish derived Swelling    Venom-honey bee Swelling     Social History     Socioeconomic History    Marital status: Single   Tobacco Use    Smoking status: Never    Smokeless tobacco: Never   Substance and Sexual Activity    Alcohol use: No    Drug use: No    Sexual activity: Yes     Partners: Male     Birth control/protection: See Surgical Hx     Social Determinants of Health     Financial Resource Strain: Low Risk  (3/28/2022)    Overall Financial Resource Strain (CARDIA)     Difficulty of Paying Living Expenses: Not very hard   Food Insecurity: No Food Insecurity (3/28/2022)    Hunger Vital Sign     Worried About Running Out of Food in the Last Year: Never true     Ran Out of Food in the Last Year: Never true   Transportation Needs: No Transportation Needs (3/28/2022)    PRAPARE - Transportation     Lack of Transportation (Medical): No     Lack of Transportation (Non-Medical): No   Physical Activity: Sufficiently Active (3/28/2022)    Exercise Vital Sign     Days of Exercise per Week: 7 days     Minutes of Exercise per Session: 150+ min   Stress: No Stress Concern Present (3/28/2022)    [employer] - Occupational Stress Questionnaire     Feeling of Stress : Only a little   Social Connections: Socially Isolated (3/28/2022)    Social Connection and Isolation Panel [NHANES]     Frequency of Communication with Friends and Family: More than three times a week     Frequency of Social Gatherings with Friends and Family: More than three times a week     Attends Sikh Services: Never     Active Member of Clubs or Organizations: No     Attends Club or Organization Meetings: Never     Marital Status: Never    Housing Stability: Unknown (3/28/2022)    Housing Stability Vital Sign     Unable to Pay for Housing in the Last Year:  "No     Unstable Housing in the Last Year: No       ROS    REVIEW OF SYSTEMS: Negative as documented below as well as positive findings in bold.       Constitutional  Respiratory  Gastrointestinal  Skin   - Fever - Cough - Heartburn - Rash   - Chills - Spit blood - Nausea - Itching   - Weight Loss - Shortness of breath - Vomiting - Nail pain   - Malaise/Fatigue - Wheezing - Abdominal Pain  Wound/Ulcer   - Weight Gain   - Blood in Stool  Poor wound healing       - Diarrhea          Cardiovascular  Genitourinary  Neurological  HEENT   - Chest Pain - Dysuria - Burning Sensation of feet - Headache   - Palpitations - Hematuria - Tingling / Paresthesia - Congestion   - Pain at night in legs - Flank Pain - Dizziness - Sore Throat   - Cramping   - Tremor - Blurred Vision   - Leg Swelling   - Sensory Change - Double Vision   - Dizzy when standing   - Speech Change - Eye Redness       - Focal Weakness - Dry Eyes       - Loss of Consciousness          Endocrine  Musculoskeletal  Psychiatric   - Cold intolerance - Muscle Pain - Depression   - Heat intolerance - Neck Pain - Insomnia   - Anemia - Joint Pain - Memory Loss   -  Easy bruising, bleeding - Heel pain - Anxiety      Toe Pain        Leg/Ankle/Foot Pain         Objective:     Resp 18   Ht 5' 4" (1.626 m)   Wt 87.7 kg (193 lb 5.5 oz)   LMP 11/18/2023 (Approximate)   BMI 33.19 kg/m²   Vitals:    12/21/23 0851   Resp: 18   Weight: 87.7 kg (193 lb 5.5 oz)   Height: 5' 4" (1.626 m)   PainSc: 0-No pain         Physical Exam    General Appearance:   Patient appears well developed, well nourished  Patient appears stated age    Psychiatric:   Patient is oriented to time, place, and person.  Patient has appropriate mood and affect    Neck:  Trachea Midline  No visible masses    Respiratory/Ears:  No distress or labored breathing.  Able to differentiate between normal talking voice and whisper.  Able to follow commands    Eyes:  Visual Acuity intact  Lids and conjunctivae normal. " No discoloration noted.    Foot Exam  Physical Exam  Ortho Exam  Ortho/SPM Exam  Foot/Ankle Musculoskeletal Exam    B/l LE exam con't:  V:  DP 2/4, PT 2/4   CRT< 3s to all digits tested   Tibial and popliteal lymph nodes are w/o abnormality   Edema: absent, varicosities: absent    N:  Patient displays normal ankle reflexes   SILT in SP/DP/T/Joyce/Saph distributions    Ortho: +Motor EHL/FHL/TA/GA   equinus deformity present   Pes planus noted  There is moderate pain with palpation of b/l medial calcaneal tubercle  Compartments soft/compressible. No pain on passive stretch of big toe. No calf  Pain.    Derm:  skin intact, skin warm and dry, skin without ulcers or lesions, skin without induration, nails normal, texture turgor well hydrated      Imaging / Labs:      XR Weight Bearing Foot Complete 3+V Bilateral    Result Date: 11/16/2023  EXAM:  XR WEIGHT BEARING FOOT COMPLETE 3+ VIEW BILATERAL INDICATIONS:  Bilateral foot pain TECHNIQUE:  6 total views of the bilateral feet COMPARISONS:  None available. FINDINGS:  No fractures nor dislocations and no significant degenerative joint disease present in either foot.  The alignment is normal.  No joint effusions and no foreign bodies.      Normal bilateral feet Finalized on: 11/16/2023 8:38 AM By:  Zac Hernandez MD [facility]# [id number]      2023-11-16 08:40:[id number]    [facility]        Note: This was dictated using a computer transcription program. Although proofread, it may contain computer transcription errors and phonetic errors. Other human proofreading errors may also exist. Corrections may be performed at a later time. Please contact us for any clarification if needed.    Lico Segura DPM  [facility]

## 2023-12-21 NOTE — PATIENT INSTRUCTIONS
Heel Pain (Plantar Fasciitis)        Heel pain is most often caused by plantar fasciitis, a condition that is sometimes also called heel spur syndrome when a spur is present. Heel pain may also be due to other causes, such as a stress fracture, tendonitis, arthritis, nerve irritation or, rarely, a cyst.    Because there are several potential causes, it is important to have heel pain properly diagnosed. A foot and ankle surgeon is able to distinguish between all the possibilities and to determine the underlying source of your heel pain.    What Is Plantar Fasciitis?  Heel pain is often caused by plantar fasciitis  Plantar fasciitis is an inflammation of the band of tissue (the plantar fascia) that extends from the heel to the toes. In this condition, the fascia first becomes irritated and then inflamed, resulting in heel pain.    Causes  The most common cause of plantar fasciitis relates to faulty structure of the foot. For example, people who have problems with their arches, either overly flat feet or high-arched feet, are more prone to developing plantar fasciitis.    Wearing nonsupportive footwear on hard, flat surfaces puts abnormal strain on the plantar fascia and can also lead to plantar fasciitis. This is particularly evident when ones job requires long hours on the feet. Obesity and overuse may also contribute to plantar fasciitis.    Symptoms  The symptoms of plantar fasciitis are:    Pain on the bottom of the heel  Pain in the arch of the foot  Pain that is usually worse upon arising  Pain that increases over a period of months  Swelling on the bottom of the heel     People with plantar fasciitis often describe the pain as worse when they get up in the morning or after they have been sitting for long periods of time. After a few minutes of walking, the pain decreases because walking stretches the fascia. For some people, the pain subsides but returns after spending long periods of time on their  feet.    Diagnosis  To arrive at a diagnosis, the foot and ankle surgeon will obtain your medical history and examine your foot. Throughout this process, the surgeon rules out all possible causes for your heel pain other than plantar fasciitis.    In addition, diagnostic imaging studies, such as x-rays or other imaging modalities, may be used to distinguish the different types of heel pain. Sometimes heel spurs are found in patients with plantar fasciitis, but these are rarely a source of pain. When they are present, the condition may be diagnosed as plantar fasciitis/heel spur syndrome.    Nonsurgical Treatment  Treatment of plantar fasciitis begins with first-line strategies, which you can begin at home:    -Stretching exercises. Exercises that stretch out the calf muscles help ease pain and assist with recovery.  -Avoid going barefoot. When you walk without shoes, you put undue strain and stress on your plantar fascia.  -Ice. Putting an ice pack on your heel for 20 minutes several times a day helps reduce inflammation. Place a thin towel between the ice and your heel; do not apply ice directly to the skin.  -Limit activities. Cut down on extended physical activities to give your heel a rest.  -Shoe modifications. Wearing supportive shoes that have good arch support and a slightly raised heel reduces stress on the plantar fascia.  -Medications. Oral nonsteroidal anti-inflammatory drugs (NSAIDs), such as ibuprofen, may be recommended to reduce pain and inflammation.     If you still have pain after several weeks, see your foot and ankle surgeon, who may add one or more of these treatment approaches:    -Padding, taping and strapping. Placing pads in the shoe softens the impact of walking. Taping and strapping help support the foot and reduce strain on the fascia.  -Orthotic devices. Custom orthotic devices that fit into your shoe help correct the underlying structural abnormalities causing the plantar  fasciitis.  -Injection therapy. In some cases, corticosteroid injections are used to help reduce the inflammation and relieve pain.  -Removable walking cast. A removable walking cast may be used to keep your foot immobile for a few weeks to allow it to rest and heal.  -Night splint. Wearing a night splint allows you to maintain an extended stretch of the plantar fascia while sleeping. This may help reduce the morning pain experienced by some patients.  -Physical therapy. Exercises and other physical therapy measures may be used to help provide relief.     When Is Surgery Needed?  Although most patients with plantar fasciitis respond to nonsurgical treatment, a small percentage of patients may require surgery. If, after several months of nonsurgical treatment, you continue to have heel pain, surgery will be considered. Your foot and ankle surgeon will discuss the surgical options with you and determine which approach would be most beneficial for you.    Long-Term Care  No matter what kind of treatment you undergo for plantar fasciitis, the underlying causes that led to this condition may remain. Therefore, you will need to continue with preventive measures. Wearing supportive shoes, stretching and using custom orthotic devices are the mainstay of long-term treatment for plantar fasciitis.            Understanding Heel Pain  Your heel is the back part of your foot. A band of tissue called the plantar fascia connects the heel bone to the bones in the ball of your foot. Nerves run from the heel up the inside of your ankle and into your leg. When you feel pain in the bottom of your heel, the plantar fascia may be inflamed. Overuse, Achilles tightness, or excess body weight can cause the tissue to tear or pull away from the bone. Sometimes the inflamed plantar fascia also irritates a nerve, causing more pain.    What causes heel pain?  Wearing shoes with poor cushioning can irritate the tissue in your heel (plantar  fascia). Being overweight or standing for long periods can also irritate the tissue. Running, walking, tennis, and other sports that put stress on the heels can cause tiny tears in the tissue. If your lower leg muscles are tight, this is more likely to occur. A tight Achilles tendon will also contribute to heel pain.  Symptoms  You may feel pain on the bottom or on the inside edge of your heel. The pain may be sharp when you get out of bed or when you stand up after sitting for a while. You may feel a dull ache in your heel after youve been standing for a long time on a hard surface. Running can also cause a dull ache.  Preventing future problems  To prevent future heel pain, wear shoes with well-cushioned heels. And do exercises prescribed by your healthcare provider to stretch the plantar fascia and the muscles in the lower leg.   Date Last Reviewed: 9/10/2015  © 1762-9408 [employer]. [address] [place] [address]. All rights reserved. This information is not intended as a substitute for professional medical care. Always follow your healthcare professional's instructions.      Treating Plantar Fasciitis  First, your healthcare provider tries to determine the cause of your problem in order to suggest ways to relieve pain. If your pain is due to poor foot mechanics, custom-made shoe inserts (orthoses) may help.    Reduce symptoms  To relieve mild symptoms, try aspirin, ibuprofen, or other medicines as directed. Rubbing ice on the affected area may also help.  To reduce severe pain and swelling, your healthcare provider may prescribe pills or injections or a walking cast in some instances. Physical therapy, such as ultrasound or a daily stretching program, may also be recommended. Surgery is rarely required.  To reduce symptoms caused by poor foot mechanics, your foot may be taped. This supports the arch and temporarily controls movement. Night splints may also help by stretching the  fascia.  Control movement  If taping helps, your healthcare provider may prescribe orthoses. Built from plaster casts of your feet, these inserts control the way your foot moves. As a result, your symptoms should go away.  Reduce overuse  Every time your foot strikes the ground, the plantar fascia is stretched. You can reduce the strain on the plantar fascia and the possibility of overuse by following these suggestions:  Lose any excess weight.  Avoid running on hard or uneven ground.  Use orthoses at all times in your shoes and house slippers.  If surgery is needed  Your healthcare provider may consider surgery if other types of treatment don't control your pain. During surgery, the plantar fascia is partially cut to release tension. As you heal, fibrous tissue fills the space between the heel bone and the plantar fascia.   Date Last Reviewed: 10/14/2015  © 5285-8632 [employer]. [address] [place] [address]. All rights reserved. This information is not intended as a substitute for professional medical care. Always follow your healthcare professional's instructions.      Equinus          What Is Equinus?    Equinus is a condition in which the upward bending motion of the ankle joint is limited. Someone with equinus lacks the flexibility to bring the top of the foot toward the front of the leg. Equinus can occur in one or both feet. When it involves both feet, the limitation of motion is sometimes worse in one foot than in the other.    People with equinus develop ways to compensate for their limited ankle motion, and this often leads to other foot, leg or back problems. The most common methods of compensation are flattening of the arch or picking up the heel early when walking, placing increased pressure on the ball of the foot. Other patients compensate by toe walking, while a smaller number take steps by bending abnormally at the hip or knee.    Causes  There are several possible causes  for the limited range of ankle motion. Often, it is due to tightness in the Achilles tendon or calf muscles (the soleus muscle and/or gastrocnemius muscle). In some patients, this tightness is congenital (present at birth), and sometimes it is an inherited trait. Other patients acquire the tightness from being in a cast, being on crutches or frequently wearing high-heeled shoes. In addition, diabetes can affect the fibers of the Achilles tendon and cause tightness. Sometimes equinus is related to a bone blocking the ankle motion. For example, a fragment of a broken bone following an ankle injury, or bone block, can get in the way and restrict motion. Equinus may also result from one leg being shorter than the other. Less often, equinus is caused by spasms in the calf muscle. These spasms may be signs of an underlying neurologic disorder.      Foot Problems Related to Equinus  Depending on how a patient compensates for the inability to bend properly at the ankle, a variety of foot conditions can develop, including:    Plantar fasciitis (arch/heel pain)  Calf cramping  Tendonitis (inflammation in the Achilles tendon)  Metatarsalgia (pain and/or callusing on the ball of the foot)  Flatfoot  Arthritis of the midfoot (middle area of the foot)  Pressure sores on the ball of the foot or the arch  Bunions and hammertoes  Ankle pain  Shin splints     Diagnosis  Most patients with equinus are unaware they have this condition when they first visit the doctor. Instead, they come to the doctor seeking relief for foot problems associated with equinus.    To diagnose equinus, the foot and ankle surgeon will evaluate the ankle's range of motion when the knee is flexed (bent) as well as extended (straightened). This enables the surgeon to identify whether the tendon or muscle is tight and to assess whether bone is interfering with ankle motion. X-rays may also be ordered. In some cases, the foot and ankle surgeon may refer the  patient for neurologic evaluation.    Nonsurgical Treatment  Treatment includes strategies aimed at relieving the symptoms and conditions associated with equinus. In addition, the patient is treated for the equinus itself through one or more of the following options:    Night splint. The foot may be placed in a splint at night to keep it in a position that helps reduce tightness of the calf muscle.  Heel lifts. Placing heel lifts inside the shoes or wearing shoes with a moderate heel takes stress off the Achilles tendon when walking and may reduce symptoms.  Arch supports or orthotic devices. Custom orthotic devices that fit into the shoe are often prescribed to keep weight distributed properly and to help control muscle/tendon imbalance.  Physical therapy. To help remedy muscle tightness, exercises that stretch the calf muscle(s) are recommended.     When Is Surgery Needed?  In some cases, surgery may be needed to correct the cause of equinus if it is related to a tight tendon or a bone blocking the ankle motion. The foot and ankle surgeon will determine the type of procedure that is best suited to the individual patient.                Ankle Dorsiflexion/Plantarflexion (Flexibility)    Sit on the floor or in bed with your legs straight in front of you.  Point both feet. Then flex both feet.  Do this 10 to 30 times in a row.  Repeat this exercise 2 times a day, or as instructed.  Date Last Reviewed: 5/1/2016 © 2000-2016 [employer]. [address] [place] [address]. All rights reserved. This information is not intended as a substitute for professional medical care. Always follow your healthcare professional's instructions.          Arch retraining    These exercises are for your right foot. Switch sides for your left foot.  Sit in a chair or stand with both feet flat on the floor. Press down with the ball of your right foot, but only on the left side of the foot, just under the big  toe.  Then pull the bottom of your big toe back toward your heel. This should pull up the arch of your foot. Dont flex your toes while doing this. It is a subtle movement of the arch.  Hold for 5 seconds. Relax.  Date Last Reviewed: 3/10/2016  © 1466-6958 [employer]. [address] [place] [address]. All rights reserved. This information is not intended as a substitute for professional medical care. Always follow your healthcare professional's instructions.        Soleus Stretch (Flexibility)    Stand facing a wall from 3 feet away. Take one step toward the wall with your right foot.  Place both palms on the wall. Bend both knees and lean forward. Keep both heels on the floor.  Hold for 30 to 60 seconds. Then relax both legs. Repeat the exercise 2 times.  Switch legs and repeat.  Repeat this exercise 3 times a day, or as instructed.     Tip: Dont bounce while youre stretching.   Date Last Reviewed: 3/10/2016  © 9779-3975 [employer]. [address] [place] [address]. All rights reserved. This information is not intended as a substitute for professional medical care. Always follow your healthcare professional's instructions.          Recommended OTC orthotics:  -powerstep  -superfeet    Recommended shoegear:  -new balance  -ascics  -abraham goins

## 2023-12-22 ENCOUNTER — ANESTHESIA (OUTPATIENT)
Dept: SURGERY | Facility: HOSPITAL | Age: 38
End: 2023-12-22
Payer: MEDICAID

## 2023-12-22 ENCOUNTER — TELEPHONE (OUTPATIENT)
Dept: OBSTETRICS AND GYNECOLOGY | Facility: CLINIC | Age: 38
End: 2023-12-22
Payer: MEDICAID

## 2023-12-22 ENCOUNTER — ANESTHESIA EVENT (OUTPATIENT)
Dept: SURGERY | Facility: HOSPITAL | Age: 38
End: 2023-12-22
Payer: MEDICAID

## 2023-12-22 ENCOUNTER — HOSPITAL ENCOUNTER (OUTPATIENT)
Facility: HOSPITAL | Age: 38
Discharge: HOME | End: 2023-12-22
Attending: STUDENT IN AN ORGANIZED HEALTH CARE EDUCATION/TRAINING PROGRAM | Admitting: STUDENT IN AN ORGANIZED HEALTH CARE EDUCATION/TRAINING PROGRAM
Payer: MEDICAID

## 2023-12-22 VITALS
OXYGEN SATURATION: 97 % | TEMPERATURE: 98 F | SYSTOLIC BLOOD PRESSURE: 113 MMHG | HEIGHT: 69 IN | HEART RATE: 70 BPM | RESPIRATION RATE: 12 BRPM | WEIGHT: 192 LBS | BODY MASS INDEX: 28.44 KG/M2 | DIASTOLIC BLOOD PRESSURE: 68 MMHG

## 2023-12-22 DIAGNOSIS — N93.9 ABNORMAL UTERINE BLEEDING (AUB): ICD-10-CM

## 2023-12-22 DIAGNOSIS — Z98.890 HISTORY OF HYSTEROSCOPY: Primary | ICD-10-CM

## 2023-12-22 LAB
ABO + RH BLD: NORMAL
B-HCG UR QL: NEGATIVE
BASOPHILS # BLD AUTO: 0.05 K/UL (ref 0–0.2)
BASOPHILS NFR BLD: 0.6 % (ref 0–1.9)
BLD GP AB SCN CELLS X3 SERPL QL: NORMAL
CTP QC/QA: YES
DIFFERENTIAL METHOD: ABNORMAL
EOSINOPHIL # BLD AUTO: 0.1 K/UL (ref 0–0.5)
EOSINOPHIL NFR BLD: 0.6 % (ref 0–8)
ERYTHROCYTE [DISTWIDTH] IN BLOOD BY AUTOMATED COUNT: 12.9 % (ref 11.5–14.5)
HCT VFR BLD AUTO: 34 % (ref 37–48.5)
HGB BLD-MCNC: 10.8 G/DL (ref 12–16)
IMM GRANULOCYTES # BLD AUTO: 0.02 K/UL (ref 0–0.04)
IMM GRANULOCYTES NFR BLD AUTO: 0.2 % (ref 0–0.5)
LYMPHOCYTES # BLD AUTO: 1.8 K/UL (ref 1–4.8)
LYMPHOCYTES NFR BLD: 21.7 % (ref 18–48)
MCH RBC QN AUTO: 30.7 PG (ref 27–31)
MCHC RBC AUTO-ENTMCNC: 31.8 G/DL (ref 32–36)
MCV RBC AUTO: 97 FL (ref 82–98)
MONOCYTES # BLD AUTO: 0.6 K/UL (ref 0.3–1)
MONOCYTES NFR BLD: 7 % (ref 4–15)
NEUTROPHILS # BLD AUTO: 5.7 K/UL (ref 1.8–7.7)
NEUTROPHILS NFR BLD: 69.9 % (ref 38–73)
NRBC BLD-RTO: 0 /100 WBC
PLATELET # BLD AUTO: 352 K/UL (ref 150–450)
PMV BLD AUTO: 11.3 FL (ref 9.2–12.9)
RBC # BLD AUTO: 3.52 M/UL (ref 4–5.4)
SPECIMEN OUTDATE: NORMAL
WBC # BLD AUTO: 8.14 K/UL (ref 3.9–12.7)

## 2023-12-22 PROCEDURE — 58563 PR HYSTEROSCOPY,W/ENDOMETRIAL ABLATION: ICD-10-PCS | Mod: ,,, | Performed by: STUDENT IN AN ORGANIZED HEALTH CARE EDUCATION/TRAINING PROGRAM

## 2023-12-22 PROCEDURE — 36000706: Performed by: STUDENT IN AN ORGANIZED HEALTH CARE EDUCATION/TRAINING PROGRAM

## 2023-12-22 PROCEDURE — D9220A PRA ANESTHESIA: Mod: CRNA,,, | Performed by: NURSE ANESTHETIST, CERTIFIED REGISTERED

## 2023-12-22 PROCEDURE — 36000707: Performed by: STUDENT IN AN ORGANIZED HEALTH CARE EDUCATION/TRAINING PROGRAM

## 2023-12-22 PROCEDURE — 63600175 PHARM REV CODE 636 W HCPCS: Performed by: NURSE ANESTHETIST, CERTIFIED REGISTERED

## 2023-12-22 PROCEDURE — 25000003 PHARM REV CODE 250: Performed by: STUDENT IN AN ORGANIZED HEALTH CARE EDUCATION/TRAINING PROGRAM

## 2023-12-22 PROCEDURE — 37000009 HC ANESTHESIA EA ADD 15 MINS: Performed by: STUDENT IN AN ORGANIZED HEALTH CARE EDUCATION/TRAINING PROGRAM

## 2023-12-22 PROCEDURE — 36415 COLL VENOUS BLD VENIPUNCTURE: CPT | Performed by: STUDENT IN AN ORGANIZED HEALTH CARE EDUCATION/TRAINING PROGRAM

## 2023-12-22 PROCEDURE — D9220A PRA ANESTHESIA: ICD-10-PCS | Mod: ANES,,, | Performed by: ANESTHESIOLOGY

## 2023-12-22 PROCEDURE — 85025 COMPLETE CBC W/AUTO DIFF WBC: CPT | Performed by: STUDENT IN AN ORGANIZED HEALTH CARE EDUCATION/TRAINING PROGRAM

## 2023-12-22 PROCEDURE — 81025 URINE PREGNANCY TEST: CPT | Performed by: STUDENT IN AN ORGANIZED HEALTH CARE EDUCATION/TRAINING PROGRAM

## 2023-12-22 PROCEDURE — 27201423 OPTIME MED/SURG SUP & DEVICES STERILE SUPPLY: Performed by: STUDENT IN AN ORGANIZED HEALTH CARE EDUCATION/TRAINING PROGRAM

## 2023-12-22 PROCEDURE — 58563 HYSTEROSCOPY ABLATION: CPT | Mod: ,,, | Performed by: STUDENT IN AN ORGANIZED HEALTH CARE EDUCATION/TRAINING PROGRAM

## 2023-12-22 PROCEDURE — 71000033 HC RECOVERY, INTIAL HOUR: Performed by: STUDENT IN AN ORGANIZED HEALTH CARE EDUCATION/TRAINING PROGRAM

## 2023-12-22 PROCEDURE — 25000003 PHARM REV CODE 250: Performed by: ANESTHESIOLOGY

## 2023-12-22 PROCEDURE — D9220A PRA ANESTHESIA: ICD-10-PCS | Mod: CRNA,,, | Performed by: NURSE ANESTHETIST, CERTIFIED REGISTERED

## 2023-12-22 PROCEDURE — D9220A PRA ANESTHESIA: Mod: ANES,,, | Performed by: ANESTHESIOLOGY

## 2023-12-22 PROCEDURE — 71000015 HC POSTOP RECOV 1ST HR: Performed by: STUDENT IN AN ORGANIZED HEALTH CARE EDUCATION/TRAINING PROGRAM

## 2023-12-22 PROCEDURE — 86850 RBC ANTIBODY SCREEN: CPT | Performed by: STUDENT IN AN ORGANIZED HEALTH CARE EDUCATION/TRAINING PROGRAM

## 2023-12-22 PROCEDURE — 37000008 HC ANESTHESIA 1ST 15 MINUTES: Performed by: STUDENT IN AN ORGANIZED HEALTH CARE EDUCATION/TRAINING PROGRAM

## 2023-12-22 PROCEDURE — 25000003 PHARM REV CODE 250: Performed by: NURSE ANESTHETIST, CERTIFIED REGISTERED

## 2023-12-22 PROCEDURE — 63600175 PHARM REV CODE 636 W HCPCS: Performed by: ANESTHESIOLOGY

## 2023-12-22 RX ORDER — FAMOTIDINE 20 MG/1
20 TABLET, FILM COATED ORAL
Status: COMPLETED | OUTPATIENT
Start: 2023-12-22 | End: 2023-12-22

## 2023-12-22 RX ORDER — OXYCODONE HYDROCHLORIDE 5 MG/1
5 TABLET ORAL
Status: DISCONTINUED | OUTPATIENT
Start: 2023-12-22 | End: 2023-12-22 | Stop reason: HOSPADM

## 2023-12-22 RX ORDER — SCOLOPAMINE TRANSDERMAL SYSTEM 1 MG/1
1 PATCH, EXTENDED RELEASE TRANSDERMAL ONCE
Status: DISCONTINUED | OUTPATIENT
Start: 2023-12-22 | End: 2023-12-22 | Stop reason: HOSPADM

## 2023-12-22 RX ORDER — IBUPROFEN 600 MG/1
600 TABLET ORAL EVERY 6 HOURS PRN
Status: CANCELLED | OUTPATIENT
Start: 2023-12-22

## 2023-12-22 RX ORDER — OXYCODONE HYDROCHLORIDE 5 MG/1
5 TABLET ORAL EVERY 4 HOURS PRN
Status: CANCELLED | OUTPATIENT
Start: 2023-12-22

## 2023-12-22 RX ORDER — ONDANSETRON 2 MG/ML
INJECTION INTRAMUSCULAR; INTRAVENOUS
Status: DISCONTINUED | OUTPATIENT
Start: 2023-12-22 | End: 2023-12-22

## 2023-12-22 RX ORDER — DIPHENHYDRAMINE HCL 25 MG
25 CAPSULE ORAL EVERY 4 HOURS PRN
Status: CANCELLED | OUTPATIENT
Start: 2023-12-22

## 2023-12-22 RX ORDER — DIPHENHYDRAMINE HYDROCHLORIDE 50 MG/ML
25 INJECTION INTRAMUSCULAR; INTRAVENOUS EVERY 4 HOURS PRN
Status: CANCELLED | OUTPATIENT
Start: 2023-12-22

## 2023-12-22 RX ORDER — FENTANYL CITRATE 50 UG/ML
INJECTION, SOLUTION INTRAMUSCULAR; INTRAVENOUS
Status: DISCONTINUED | OUTPATIENT
Start: 2023-12-22 | End: 2023-12-22

## 2023-12-22 RX ORDER — KETOROLAC TROMETHAMINE 30 MG/ML
INJECTION, SOLUTION INTRAMUSCULAR; INTRAVENOUS
Status: DISCONTINUED | OUTPATIENT
Start: 2023-12-22 | End: 2023-12-22

## 2023-12-22 RX ORDER — PROCHLORPERAZINE EDISYLATE 5 MG/ML
5 INJECTION INTRAMUSCULAR; INTRAVENOUS EVERY 6 HOURS PRN
Status: DISCONTINUED | OUTPATIENT
Start: 2023-12-22 | End: 2023-12-22 | Stop reason: HOSPADM

## 2023-12-22 RX ORDER — DEXAMETHASONE SODIUM PHOSPHATE 4 MG/ML
INJECTION, SOLUTION INTRA-ARTICULAR; INTRALESIONAL; INTRAMUSCULAR; INTRAVENOUS; SOFT TISSUE
Status: DISCONTINUED | OUTPATIENT
Start: 2023-12-22 | End: 2023-12-22

## 2023-12-22 RX ORDER — PROCHLORPERAZINE EDISYLATE 5 MG/ML
5 INJECTION INTRAMUSCULAR; INTRAVENOUS EVERY 30 MIN PRN
Status: DISCONTINUED | OUTPATIENT
Start: 2023-12-22 | End: 2023-12-22 | Stop reason: HOSPADM

## 2023-12-22 RX ORDER — PHENYLEPHRINE HYDROCHLORIDE 10 MG/ML
INJECTION INTRAVENOUS
Status: DISCONTINUED | OUTPATIENT
Start: 2023-12-22 | End: 2023-12-22

## 2023-12-22 RX ORDER — OXYCODONE HYDROCHLORIDE 5 MG/1
10 TABLET ORAL EVERY 4 HOURS PRN
Status: CANCELLED | OUTPATIENT
Start: 2023-12-22

## 2023-12-22 RX ORDER — SODIUM CHLORIDE 0.9 % (FLUSH) 0.9 %
10 SYRINGE (ML) INJECTION
Status: DISCONTINUED | OUTPATIENT
Start: 2023-12-22 | End: 2023-12-22 | Stop reason: HOSPADM

## 2023-12-22 RX ORDER — LIDOCAINE HYDROCHLORIDE 20 MG/ML
INJECTION INTRAVENOUS
Status: DISCONTINUED | OUTPATIENT
Start: 2023-12-22 | End: 2023-12-22

## 2023-12-22 RX ORDER — ACETAMINOPHEN 10 MG/ML
INJECTION, SOLUTION INTRAVENOUS
Status: DISCONTINUED | OUTPATIENT
Start: 2023-12-22 | End: 2023-12-22

## 2023-12-22 RX ORDER — IBUPROFEN 800 MG/1
800 TABLET ORAL EVERY 8 HOURS PRN
Qty: 60 TABLET | Refills: 0 | Status: SHIPPED | OUTPATIENT
Start: 2023-12-22 | End: 2024-01-31

## 2023-12-22 RX ORDER — OXYCODONE AND ACETAMINOPHEN 5; 325 MG/1; MG/1
1 TABLET ORAL EVERY 4 HOURS PRN
Qty: 5 TABLET | Refills: 0 | Status: SHIPPED | OUTPATIENT
Start: 2023-12-22 | End: 2024-01-31

## 2023-12-22 RX ORDER — PROPOFOL 10 MG/ML
VIAL (ML) INTRAVENOUS
Status: DISCONTINUED | OUTPATIENT
Start: 2023-12-22 | End: 2023-12-22

## 2023-12-22 RX ORDER — SODIUM CHLORIDE 9 MG/ML
INJECTION, SOLUTION INTRAVENOUS CONTINUOUS
Status: DISCONTINUED | OUTPATIENT
Start: 2023-12-22 | End: 2023-12-22 | Stop reason: HOSPADM

## 2023-12-22 RX ORDER — HYDROMORPHONE HYDROCHLORIDE 2 MG/ML
0.2 INJECTION, SOLUTION INTRAMUSCULAR; INTRAVENOUS; SUBCUTANEOUS EVERY 5 MIN PRN
Status: DISCONTINUED | OUTPATIENT
Start: 2023-12-22 | End: 2023-12-22 | Stop reason: HOSPADM

## 2023-12-22 RX ORDER — MUPIROCIN 20 MG/G
OINTMENT TOPICAL
Status: DISCONTINUED | OUTPATIENT
Start: 2023-12-22 | End: 2023-12-22 | Stop reason: HOSPADM

## 2023-12-22 RX ORDER — ONDANSETRON 8 MG/1
8 TABLET, ORALLY DISINTEGRATING ORAL EVERY 8 HOURS PRN
Status: DISCONTINUED | OUTPATIENT
Start: 2023-12-22 | End: 2023-12-22 | Stop reason: HOSPADM

## 2023-12-22 RX ORDER — SODIUM CHLORIDE, SODIUM LACTATE, POTASSIUM CHLORIDE, CALCIUM CHLORIDE 600; 310; 30; 20 MG/100ML; MG/100ML; MG/100ML; MG/100ML
INJECTION, SOLUTION INTRAVENOUS CONTINUOUS PRN
Status: DISCONTINUED | OUTPATIENT
Start: 2023-12-22 | End: 2023-12-22

## 2023-12-22 RX ADMIN — SCOPALAMINE 1 PATCH: 1 PATCH, EXTENDED RELEASE TRANSDERMAL at 06:12

## 2023-12-22 RX ADMIN — DEXAMETHASONE SODIUM PHOSPHATE 8 MG: 4 INJECTION, SOLUTION INTRA-ARTICULAR; INTRALESIONAL; INTRAMUSCULAR; INTRAVENOUS; SOFT TISSUE at 07:12

## 2023-12-22 RX ADMIN — FENTANYL CITRATE 25 MCG: 0.05 INJECTION, SOLUTION INTRAMUSCULAR; INTRAVENOUS at 07:12

## 2023-12-22 RX ADMIN — FAMOTIDINE 20 MG: 20 TABLET ORAL at 06:12

## 2023-12-22 RX ADMIN — PROCHLORPERAZINE EDISYLATE 5 MG: 5 INJECTION INTRAMUSCULAR; INTRAVENOUS at 08:12

## 2023-12-22 RX ADMIN — GLYCOPYRROLATE 0.2 MG: 0.2 INJECTION, SOLUTION INTRAMUSCULAR; INTRAVITREAL at 07:12

## 2023-12-22 RX ADMIN — OXYCODONE HYDROCHLORIDE 5 MG: 5 TABLET ORAL at 08:12

## 2023-12-22 RX ADMIN — PROPOFOL 200 MG: 10 INJECTION, EMULSION INTRAVENOUS at 07:12

## 2023-12-22 RX ADMIN — ACETAMINOPHEN 1000 MG: 10 INJECTION, SOLUTION INTRAVENOUS at 07:12

## 2023-12-22 RX ADMIN — ONDANSETRON 4 MG: 2 INJECTION, SOLUTION INTRAMUSCULAR; INTRAVENOUS at 07:12

## 2023-12-22 RX ADMIN — KETOROLAC TROMETHAMINE 30 MG: 30 INJECTION, SOLUTION INTRAMUSCULAR; INTRAVENOUS at 07:12

## 2023-12-22 RX ADMIN — SODIUM CHLORIDE, SODIUM LACTATE, POTASSIUM CHLORIDE, AND CALCIUM CHLORIDE: .6; .31; .03; .02 INJECTION, SOLUTION INTRAVENOUS at 07:12

## 2023-12-22 RX ADMIN — HYDROMORPHONE HYDROCHLORIDE 0.2 MG: 2 INJECTION, SOLUTION INTRAMUSCULAR; INTRAVENOUS; SUBCUTANEOUS at 08:12

## 2023-12-22 RX ADMIN — MUPIROCIN: 20 OINTMENT TOPICAL at 06:12

## 2023-12-22 RX ADMIN — PHENYLEPHRINE HYDROCHLORIDE 200 MCG: 10 INJECTION INTRAVENOUS at 07:12

## 2023-12-22 RX ADMIN — LIDOCAINE HYDROCHLORIDE 100 MG: 20 INJECTION, SOLUTION INTRAVENOUS at 07:12

## 2023-12-22 NOTE — DISCHARGE SUMMARY
[facility]  Brief Op Note/Discharge Note  Short Stay    Admit Date: 12/22/2023    Discharge Date: 12/22/2023    Attending Physician: Sydnee Thorpe MD     Surgery Date: 12/22/2023     Surgeon(s) and Role:     * Sydnee Thorpe MD - Primary    Assisting Surgeon: None    Pre-op Diagnosis:  Abnormal uterine bleeding (AUB) [N93.9]    Post-op Diagnosis:  Post-Op Diagnosis Codes:     * Abnormal uterine bleeding (AUB) [N93.9]    Procedure(s) (LRB):  ABLATION, ENDOMETRIUM (N/A)    Anesthesia: General    Findings/Key Components:   Uterus sounded to 8 cm  Normal appearing endometrial cavity  Tubal ostia visualized without complication  Uncomplicated endometrial ablation    Estimated Blood Loss: Minimal < 20 cc         Specimens:   Specimen (24h ago, onward)      None            Discharge Provider: Sydnee Thorpe    Diagnoses:  Active Hospital Problems    Diagnosis  POA    History of Hysteroscopy/Endometrial Ablation [Z98.890]  Not Applicable      Resolved Hospital Problems   No resolved problems to display.       Discharged Condition: good    Hospital Course:   Patient was admitted for outpatient procedure as above, and tolerated the procedure well with no complications. Please see operative report for further details. Following the procedure, the patient was awakened from anesthesia and transferred to the recovery area in stable condition. She was discharged to home once ambulating, voiding, tolerating PO intake, and pain was well-controlled. Patient was given routine post-op instructions and prescriptions for pain medication to take as needed. Patient instructed to follow up with me in 6 weeks.    Final Diagnoses: Same as principal problem.    Disposition: Home or Self Care    Follow up/Patient Instructions:    Medications:  Reconciled Home Medications:      Medication List        START taking these medications      oxyCODONE-acetaminophen 5-325 mg per tablet  Commonly known as: PERCOCET  Take 1 tablet by  mouth every 4 (four) hours as needed for Pain.            CHANGE how you take these medications      fluticasone propionate 50 mcg/actuation nasal spray  Commonly known as: FLONASE  1 spray (50 mcg total) by Each Nostril route 2 (two) times daily as needed for Rhinitis.  What changed: Another medication with the same name was removed. Continue taking this medication, and follow the directions you see here.     * ibuprofen 600 MG tablet  Commonly known as: ADVIL,MOTRIN  Take 1 tablet (600 mg total) by mouth every 6 (six) hours as needed for Pain.  What changed: Another medication with the same name was added. Make sure you understand how and when to take each.     * ibuprofen 800 MG tablet  Commonly known as: ADVIL,MOTRIN  Take 1 tablet (800 mg total) by mouth every 8 (eight) hours as needed for Pain.  What changed: You were already taking a medication with the same name, and this prescription was added. Make sure you understand how and when to take each.           * This list has 2 medication(s) that are the same as other medications prescribed for you. Read the directions carefully, and ask your doctor or other care provider to review them with you.                CONTINUE taking these medications      clotrimazole-betamethasone 1-0.05% cream  Commonly known as: LOTRISONE  Apply topically 2 (two) times daily.     EPINEPHrine 0.3 mg/0.3 mL Atin  Commonly known as: EPIPEN  Inject into the muscle.     ergocalciferol 50,000 unit Cap  Commonly known as: ERGOCALCIFEROL  Take 50,000 Units by mouth every 7 days.     etodolac 400 MG 24 hr tablet  Commonly known as: LODINE XL  Take 400 mg by mouth daily as needed.     levocetirizine 5 MG tablet  Commonly known as: XYZAL  Take 1 tablet (5 mg total) by mouth every evening.     medroxyPROGESTERone 10 MG tablet  Commonly known as: PROVERA  Take 1 tablet (10 mg total) by mouth once daily.     meloxicam 7.5 MG tablet  Commonly known as: MOBIC  Take 1 tablet (7.5 mg total) by mouth  once daily.            STOP taking these medications      terconazole 0.4 % Crea  Commonly known as: TERAZOL 7            Discharge Procedure Orders   Diet general     Pelvic Rest   Order Comments: Pelvic Rest - Nothing in the Vagina for 6 weeks.     Call MD for:   Order Comments: Vaginal Bleeding greater than a pad per hour.     Call MD for:  extreme fatigue     Call MD for:  persistent dizziness or light-headedness     Call MD for:  hives     Call MD for:  redness, tenderness, or signs of infection (pain, swelling, redness, odor or green/yellow discharge around incision site)     Call MD for:  difficulty breathing, headache or visual disturbances     Call MD for:  severe uncontrolled pain     Call MD for:  persistent nausea and vomiting     Call MD for:  temperature >100.4      Follow-up Information       Sydnee Thorpe MD Follow up in 6 week(s).    Specialty: Obstetrics and Gynecology  Why: For Post-Op Visit  Contact information:  [address]  [address] [facility]  [place] [address]  [phone]                           Sydnee Thorpe M.D.  OB/GYN  [facility]

## 2023-12-22 NOTE — PLAN OF CARE
Patient has met PACU discharge criteria, VSS on room air, pain controlled. Family updated by text. Tolerated apple juice. Released from PACU by Anesthesia MD.

## 2023-12-22 NOTE — ANESTHESIA POSTPROCEDURE EVALUATION
Anesthesia Post Evaluation    Patient: Janie Barbosa    Procedure(s) Performed: Procedure(s) (LRB):  ABLATION, ENDOMETRIUM (N/A)  HYSTEROSCOPY (N/A)    Final Anesthesia Type: general      Patient location during evaluation: PACU  Patient participation: Yes- Able to Participate  Level of consciousness: awake and alert, oriented and awake  Post-procedure vital signs: reviewed and stable  Pain management: adequate  Airway patency: patent    PONV status at discharge: No PONV  Anesthetic complications: no      Cardiovascular status: stable  Respiratory status: unassisted and room air  Hydration status: euvolemic  Follow-up not needed.              Vitals Value Taken Time   /68 12/22/23 1000   Temp 36.8 °C (98.2 °F) 12/22/23 1000   Pulse 70 12/22/23 1000   Resp 12 12/22/23 1000   SpO2 97 % 12/22/23 1000         Event Time   Out of Recovery 08:56:53         Pain/My Score: Pain Rating Prior to Med Admin: 3 (12/22/2023  8:53 AM)  Pain Rating Post Med Admin: 3 (12/22/2023  8:55 AM)  My Score: 10 (12/22/2023  9:30 AM)

## 2023-12-22 NOTE — TRANSFER OF CARE
"Anesthesia Transfer of Care Note    Patient: Janie Barbosa    Procedure(s) Performed: Procedure(s) (LRB):  ABLATION, ENDOMETRIUM (N/A)    Patient location: PACU    Anesthesia Type: general    Transport from OR: Transported from OR on 6-10 L/min O2 by face mask with adequate spontaneous ventilation    Post pain: adequate analgesia    Post assessment: no apparent anesthetic complications and tolerated procedure well    Post vital signs: stable    Level of consciousness: awake    Nausea/Vomiting: no nausea/vomiting    Complications: none    Transfer of care protocol was followed      Last vitals: Visit Vitals  /75   Pulse 85   Temp 36.5 °C (97.7 °F) (Skin)   Resp 16   Ht 5' 9" (1.753 m)   Wt 87.1 kg (192 lb)   SpO2 100%   Breastfeeding No   BMI 28.35 kg/m²     "

## 2023-12-22 NOTE — H&P
I agree with the H&P from 23 with no changes.     Sydnee Thorpe M.D.  OB/GYN  [facility]      History & Physical  Gynecology        SUBJECTIVE:      Chief Complaint: PreOp Exam        History of Present Illness:  John is a 38 yr old  who presents to clinic for a preoperative exam for a hysteroscopy/D&C with endometrial ablation. This is a patient who was previously seen by Dr. Gary in the past who planned for an endometrial ablation in an effort to manage menorrhagia. Following his departure from clinic out clinic, she was lost to follow-up. Since she her in clinic for the first time, I completed endometrial sampling and reviewed her prior work-up. She continues to desire the endometrial ablation.      TVUS:   FINDINGS:  Uterus is enlarged measuring 10.9 x 4.1 x 5.1 cm.  The endometrial thickness is 6 mm.  Nine and 15 mm intramural fibroids are seen.     Right ovary is is surgically absent.    Left ovary is normal and measures 3 x 1.9 x 3.1 cm. 1.9 cm follicle or cyst seen within the left ovary.  Flow is demonstrated within the left ovary.     There is no free pelvic fluid.     Impression:     See above.        EMBX Pathology:   Fragments of early secretory endometrium, no atypical hyperplasia or malignancy identified.           Review of patient's allergies indicates:   Allergen Reactions    Shellfish containing products Anaphylaxis and Swelling    Codeine Nausea And Vomiting         History reviewed. No pertinent past medical history.        Past Surgical History:   Procedure Laterality Date     SECTION        CYST REMOVAL        DILATION AND CURETTAGE OF UTERUS        ECTOPIC PREGNANCY SURGERY        SALPINGECTOMY Right      SINUS SURGERY        TUBAL LIGATION          OB History            6    Para   4    Term   4            AB   2    Living   4           SAB        IAB        Ectopic        Multiple        Live Births   4                        Family History    Problem Relation Age of Onset    Breast cancer Neg Hx      Colon cancer Neg Hx      Ovarian cancer Neg Hx        Social History           Tobacco Use    Smoking status: Never    Smokeless tobacco: Never   Substance Use Topics    Alcohol use: No    Drug use: No              Current Outpatient Medications   Medication Sig    clotrimazole-betamethasone 1-0.05% (LOTRISONE) cream Apply topically 2 (two) times daily.    EPINEPHrine (EPIPEN) 0.3 mg/0.3 mL AtIn Inject into the muscle.    ergocalciferol (ERGOCALCIFEROL) 50,000 unit Cap Take 50,000 Units by mouth every 7 days.    etodolac (LODINE XL) 400 MG 24 hr tablet Take 400 mg by mouth daily as needed.    fluticasone propionate (FLONASE) 50 mcg/actuation nasal spray 1 spray (50 mcg total) by Each Nostril route 2 (two) times daily as needed for Rhinitis.    ibuprofen (ADVIL,MOTRIN) 600 MG tablet Take 1 tablet (600 mg total) by mouth every 6 (six) hours as needed for Pain.    meloxicam (MOBIC) 7.5 MG tablet Take 1 tablet (7.5 mg total) by mouth once daily.    fluticasone propionate (FLONASE) 50 mcg/actuation nasal spray 1 spray (50 mcg total) by Each Nostril route 2 (two) times daily as needed for Rhinitis. (Patient not taking: Reported on 1/3/2023)    levocetirizine (XYZAL) 5 MG tablet Take 1 tablet (5 mg total) by mouth every evening.    medroxyPROGESTERone (PROVERA) 10 MG tablet Take 1 tablet (10 mg total) by mouth once daily.    terconazole (TERAZOL 7) 0.4 % Crea Place 1 applicator vaginally every evening. (Patient not taking: Reported on 4/27/2023)    terconazole (TERAZOL 7) 0.4 % Crea Place 1 applicator vaginally every evening.    terconazole (TERAZOL 7) 0.4 % Crea Place 1 applicator vaginally every evening.      No current facility-administered medications for this visit.         Review of Systems:  Review of Systems   Constitutional:  Negative for chills, fatigue and fever.   HENT:  Negative for congestion.    Eyes:  Negative for visual disturbance.    Respiratory:  Negative for cough and shortness of breath.    Cardiovascular:  Negative for chest pain and palpitations.   Gastrointestinal:  Negative for abdominal distention, abdominal pain, constipation, diarrhea, nausea and vomiting.   Genitourinary:  Positive for menstrual problem. Negative for difficulty urinating, dysuria, hematuria, vaginal bleeding and vaginal discharge.   Skin:  Negative for rash.   Neurological:  Negative for dizziness, seizures, light-headedness and headaches.   Hematological:  Does not bruise/bleed easily.   Psychiatric/Behavioral:  Negative for dysphoric mood. The patient is not nervous/anxious.          OBJECTIVE:      Physical Exam:      Vitals:     11/21/23 1002   BP: 92/70      Physical Exam  Vitals and nursing note reviewed.   Constitutional:       General: She is not in acute distress.     Appearance: She is well-developed.   HENT:      Head: Normocephalic and atraumatic.   Eyes:      Pupils: Pupils are equal, round, and reactive to light.   Cardiovascular:      Rate and Rhythm: Normal rate and regular rhythm.   Pulmonary:      Effort: Pulmonary effort is normal. No respiratory distress.   Abdominal:      General: There is no distension.      Palpations: Abdomen is soft. There is no mass.      Tenderness: There is no abdominal tenderness. There is no guarding.   Musculoskeletal:         General: Normal range of motion.      Cervical back: Normal range of motion and neck supple.   Skin:     General: Skin is warm and dry.   Neurological:      Mental Status: She is alert and oriented to person, place, and time.   Psychiatric:         Behavior: Behavior normal.         Thought Content: Thought content normal.         Judgment: Judgment normal.            ASSESSMENT/PLAN:      1. Preop examination  2. Abnormal uterine bleeding (AUB)  - Imaging and pathology as above  - Patient was thoroughly counseled on the possible and expected outcomes of the procedure  - She was counseled on the  R/B/As of planned procedure and wishes to proceed  - Consents signed  - Case request placed  - Surgical coordinator contacted        Sydnee Thorpe M.D.  OB/GYN  [facility]

## 2023-12-22 NOTE — ANESTHESIA PREPROCEDURE EVALUATION
12/22/2023  Janie Barbosa is a 38 y.o., female here for uterine ablation      Pre-op Assessment    I have reviewed the Patient Summary Reports.    I have reviewed the NPO Status.   I have reviewed the Medications.     Review of Systems  Anesthesia Hx:  No problems with previous Anesthesia             Denies Family Hx of Anesthesia complications.    Denies Personal Hx of Anesthesia complications.                    Social:  Non-Smoker, No Alcohol Use       Hematology/Oncology:  Hematology Normal   Oncology Normal                                   EENT/Dental:  EENT/Dental Normal           Cardiovascular:  Cardiovascular Normal                                            Pulmonary:  Pulmonary Normal                       Renal/:  Renal/ Normal                 Hepatic/GI:  Hepatic/GI Normal                 Musculoskeletal:  Musculoskeletal Normal                Neurological:  Neurology Normal                                      Endocrine:        Obesity / BMI > 30  Dermatological:  Skin Normal    Psych:  Psychiatric Normal                    Physical Exam  General: Well nourished, Cooperative, Alert and Oriented    Airway:  Mallampati: II   Mouth Opening: Normal  TM Distance: Normal  Tongue: Normal  Neck ROM: Normal ROM    Dental:  Intact        Anesthesia Plan  Type of Anesthesia, risks & benefits discussed:    Anesthesia Type: Gen ETT, Gen Supraglottic Airway  Intra-op Monitoring Plan: Standard ASA Monitors  Post Op Pain Control Plan: multimodal analgesia  Induction:  IV  Airway Plan: Video and Direct, Post-Induction  Informed Consent: Informed consent signed with the Patient and all parties understand the risks and agree with anesthesia plan.  All questions answered.   ASA Score: 2    Ready For Surgery From Anesthesia Perspective.     .

## 2023-12-22 NOTE — OP NOTE
OPERATIVE REPORT    12/22/2023    PREOPERATIVE DIAGNOSIS  1. AUB    POSTOPERATIVE DIAGNOSIS  1. AUB S/P Endometrial ablation    PROCEDURE:  1. Hysteroscopy  2. Dilation and curettage  3. Endometrial ablation using NovaSure     SURGEON: Sydnee Thorpe MD    ANESTHESIA: General    COMPLICATIONS: None    EBL: Minimal < 10 cc    IV FLUIDS: See anesthesia record    URINE OUTPUT: 50 cc drained via in-and-out catheterization prior to procedure    Hysteroscopy fluid deficit: 40 cc    FINDINGS:    Uterus sounded to 8 cm  Normal appearing endometrial cavity  Tubal ostia visualized without complication  Uncomplicated endometrial ablation    PROCEDURE:   Patient was taken to the operating room where general anesthesia was administered and found to be adequate.  She was placed in the dorsal lithotomy position using Ciaran stirrups, then prepped and draped in the usual sterile fashion. Bladder was drained via in-and-out catheterization prior to procedure.  A surgical timeout was performed with patient's name, date of birth, procedure to be performed, and allergies verbalized. All OR staff in agreement. No preoperative antibiotics were administered as none were indicated.    Attention was then turned to the vagina where a weighted sterile speculum was placed in the posterior aspect, and a right angle retractor was placed in the anterior aspect.  The anterior lip of the cervix was grasped with a single tooth tenaculum.  The uterus was sounded to approximately 8 cm. The cervix was serially dilated to 7 mm. The 7 mm hysteroscope was advanced through the cervical os and the uterus was distended with normal saline.  The endometrium was inspected and found to be grossly normal in appearance.  The tubal ostia were identified without difficulty, and appeared normal. The hysteroscope was withdrawn without difficulty.     The cavity length was determined to be 5.5 cm with Novasure device. The Novasure was placed into the uterine cavity and  the width was found to be 3.7 cm.  The ablation procedure was performed at a power of 112 W for a total of 111 seconds. The Novasure was then removed without complication.     Sponge and instrument counts were correct x 2. The patient tolerated the procedure well and was awakened without difficulty. She was taken to the recovery room in stable condition.    Sydnee Thorpe M.D.  OB/GYN  [facility]

## 2023-12-29 PROBLEM — M79.672 BILATERAL FOOT PAIN: Status: ACTIVE | Noted: 2023-12-29

## 2023-12-29 PROBLEM — R29.898 BILATERAL LEG WEAKNESS: Status: ACTIVE | Noted: 2023-12-29

## 2023-12-29 PROBLEM — M72.2 BILATERAL PLANTAR FASCIITIS: Status: ACTIVE | Noted: 2023-12-29

## 2023-12-29 PROBLEM — M25.672 DECREASED RANGE OF MOTION OF BOTH ANKLES: Status: ACTIVE | Noted: 2023-12-29

## 2023-12-29 PROBLEM — M79.671 BILATERAL FOOT PAIN: Status: ACTIVE | Noted: 2023-12-29

## 2023-12-29 PROBLEM — M25.671 DECREASED RANGE OF MOTION OF BOTH ANKLES: Status: ACTIVE | Noted: 2023-12-29

## 2024-01-05 RX ORDER — DEXAMETHASONE SODIUM PHOSPHATE 4 MG/ML
4 INJECTION, SOLUTION INTRA-ARTICULAR; INTRALESIONAL; INTRAMUSCULAR; INTRAVENOUS; SOFT TISSUE
Status: DISCONTINUED | OUTPATIENT
Start: 2023-12-21 | End: 2024-01-05 | Stop reason: HOSPADM

## 2024-01-23 ENCOUNTER — TELEPHONE (OUTPATIENT)
Dept: OBSTETRICS AND GYNECOLOGY | Facility: CLINIC | Age: 39
End: 2024-01-23
Payer: MEDICAID

## 2024-01-23 NOTE — TELEPHONE ENCOUNTER
----- Message from Patsy Espinoza sent at 1/23/2024 11:55 AM CST -----  Type:  Sooner Appointment Request    Caller is requesting a sooner appointment.  Caller declined first available appointment listed below.  Caller will not accept being placed on the waitlist and is requesting a message be sent to doctor.  Name of Caller:pt  When is the first available appointment?02/06/24  Symptoms:post op  Would the patient rather a call back or a response via [facility]? call  Best Call Back Number: [phone]  Additional Information:

## 2024-01-23 NOTE — TELEPHONE ENCOUNTER
Spoke to pt and informs us that she has a discharge and was wondering if this is normal. Per Maurilio we can see her tomorrow at 1p.m . Pt verbalized understanding.

## 2024-01-24 ENCOUNTER — TELEPHONE (OUTPATIENT)
Dept: OBSTETRICS AND GYNECOLOGY | Facility: CLINIC | Age: 39
End: 2024-01-24
Payer: MEDICAID

## 2024-01-24 NOTE — TELEPHONE ENCOUNTER
Spoke with pt to inform her that we have to rescheduled pt appointment today due to the water/air status in the clinic. Informed pt we will give her a call back to scheduled appointment.     Pt verbalize understanding.

## 2024-01-30 ENCOUNTER — TELEPHONE (OUTPATIENT)
Dept: OBSTETRICS AND GYNECOLOGY | Facility: CLINIC | Age: 39
End: 2024-01-30
Payer: MEDICAID

## 2024-01-30 NOTE — TELEPHONE ENCOUNTER
----- Message from Leta Miranda sent at 1/30/2024  9:10 AM CST -----  Contact: pt  Type:  Needs Medical Advice    Who Called: pt  Would the patient rather a call back or a response via [facility]? call  Best Call Back Number: [phone]   Additional Information:   Pt requesting a call regarding her appointment on 02/06.

## 2024-01-30 NOTE — TELEPHONE ENCOUNTER
Spoke to pt and helped reschedule post op to sooner due to original rs appt not working out for her anymore. Rs for 1/31/2024. Pt verbalized understanding.

## 2024-01-31 ENCOUNTER — OFFICE VISIT (OUTPATIENT)
Dept: OBSTETRICS AND GYNECOLOGY | Facility: CLINIC | Age: 39
End: 2024-01-31
Payer: MEDICAID

## 2024-01-31 ENCOUNTER — PATIENT MESSAGE (OUTPATIENT)
Dept: OBSTETRICS AND GYNECOLOGY | Facility: CLINIC | Age: 39
End: 2024-01-31

## 2024-01-31 VITALS — WEIGHT: 192.88 LBS | BODY MASS INDEX: 28.49 KG/M2

## 2024-01-31 DIAGNOSIS — Z09 POSTOPERATIVE EXAMINATION: Primary | ICD-10-CM

## 2024-01-31 PROCEDURE — 99212 OFFICE O/P EST SF 10 MIN: CPT | Mod: PBBFAC,PO | Performed by: STUDENT IN AN ORGANIZED HEALTH CARE EDUCATION/TRAINING PROGRAM

## 2024-01-31 PROCEDURE — 99999 PR PBB SHADOW E&M-EST. PATIENT-LVL II: CPT | Mod: PBBFAC,,, | Performed by: STUDENT IN AN ORGANIZED HEALTH CARE EDUCATION/TRAINING PROGRAM

## 2024-01-31 PROCEDURE — 99024 POSTOP FOLLOW-UP VISIT: CPT | Mod: ,,, | Performed by: STUDENT IN AN ORGANIZED HEALTH CARE EDUCATION/TRAINING PROGRAM

## 2024-01-31 PROCEDURE — 1159F MED LIST DOCD IN RCRD: CPT | Mod: CPTII,,, | Performed by: STUDENT IN AN ORGANIZED HEALTH CARE EDUCATION/TRAINING PROGRAM

## 2024-01-31 PROCEDURE — 1160F RVW MEDS BY RX/DR IN RCRD: CPT | Mod: CPTII,,, | Performed by: STUDENT IN AN ORGANIZED HEALTH CARE EDUCATION/TRAINING PROGRAM

## 2024-01-31 NOTE — PROGRESS NOTES
History & Physical  Gynecology      SUBJECTIVE:     Chief Complaint: Post-op Evaluation       History of Present Illness:  Janie is a 38 yr old  who presents to clinic for a postoperative exam following a endometrial ablation on 24. She presents today with minimal complaints. She initially had some brown discharge following the procedure that was somewhat worrisome for her but that has now resolved. She denies pain. She is tolerating a diet without nausea or vomiting. She endorses normal bowel and bladder habits.     Operative Findings:   Uterus sounded to 8 cm  Normal appearing endometrial cavity  Tubal ostia visualized without complication  Uncomplicated endometrial ablatio    Review of patient's allergies indicates:   Allergen Reactions    Codeine Nausea And Vomiting and Nausea Only    Hydrocodone-acetaminophen Nausea Only    Shellfish containing products Anaphylaxis and Swelling    Shellfish derived Swelling    Venom-honey bee Swelling       History reviewed. No pertinent past medical history.  Past Surgical History:   Procedure Laterality Date     SECTION      CYST REMOVAL      DILATION AND CURETTAGE OF UTERUS      ECTOPIC PREGNANCY SURGERY      ENDOMETRIAL ABLATION N/A 2023    Procedure: ABLATION, ENDOMETRIUM;  Surgeon: Sydnee Thorpe MD;  Location: [facility] OR;  Service: OB/GYN;  Laterality: N/A;    HYSTEROSCOPY N/A 2023    Procedure: HYSTEROSCOPY;  Surgeon: Sydnee Thorpe MD;  Location: [facility] OR;  Service: OB/GYN;  Laterality: N/A;    SALPINGECTOMY Right     SINUS SURGERY      TUBAL LIGATION       OB History          6    Para   4    Term   4            AB   2    Living   4         SAB        IAB        Ectopic        Multiple        Live Births   4               Family History   Problem Relation Age of Onset    Breast cancer Neg Hx     Colon cancer Neg Hx     Ovarian cancer Neg Hx      Social History     Tobacco Use    Smoking status: Never    Smokeless  tobacco: Never   Substance Use Topics    Alcohol use: No    Drug use: No       Current Outpatient Medications   Medication Sig    EPINEPHrine (EPIPEN) 0.3 mg/0.3 mL AtIn Inject into the muscle.    ergocalciferol (ERGOCALCIFEROL) 50,000 unit Cap Take 50,000 Units by mouth every 7 days.     No current facility-administered medications for this visit.       Review of Systems:  Review of Systems   Constitutional:  Negative for chills, fatigue and fever.   HENT:  Negative for congestion.    Eyes:  Negative for visual disturbance.   Respiratory:  Negative for cough and shortness of breath.    Cardiovascular:  Negative for chest pain and palpitations.   Gastrointestinal:  Negative for abdominal distention, abdominal pain, constipation, diarrhea, nausea and vomiting.   Genitourinary:  Negative for difficulty urinating, dysuria, hematuria, vaginal bleeding and vaginal discharge.   Skin:  Negative for rash.   Neurological:  Negative for dizziness, seizures, light-headedness and headaches.   Hematological:  Does not bruise/bleed easily.   Psychiatric/Behavioral:  Negative for dysphoric mood. The patient is not nervous/anxious.         OBJECTIVE:     Physical Exam:  There were no vitals filed for this visit.   Physical Exam  Vitals and nursing note reviewed. Exam conducted with a chaperone present.   Constitutional:       General: She is not in acute distress.     Appearance: She is well-developed.   HENT:      Head: Normocephalic and atraumatic.   Eyes:      Pupils: Pupils are equal, round, and reactive to light.   Cardiovascular:      Rate and Rhythm: Normal rate and regular rhythm.   Pulmonary:      Effort: Pulmonary effort is normal. No respiratory distress.   Abdominal:      General: There is no distension.      Palpations: Abdomen is soft. There is no mass.      Tenderness: There is no abdominal tenderness. There is no guarding.   Genitourinary:     Comments: SSE: Normal external female genitalia, normal urethral meatus,  normal vaginal rugae, normal vaginal mucosa, no vaginal blood in canal, normal physiologic discharge, normal cervix, no adnexal masses palpated on bimanual  Musculoskeletal:         General: Normal range of motion.      Cervical back: Normal range of motion and neck supple.   Skin:     General: Skin is warm and dry.   Neurological:      Mental Status: She is alert and oriented to person, place, and time.   Psychiatric:         Behavior: Behavior normal.         Thought Content: Thought content normal.         Judgment: Judgment normal.         ASSESSMENT/PLAN:     1. Postoperative examination  - Reassuring postoperative exam  - Operative findings as above  - Patient meeting appropriate postoperative milestones  - Precautions given     Sydnee Thorpe M.D.  OB/GYN  [facility]

## 2024-02-19 ENCOUNTER — TELEPHONE (OUTPATIENT)
Dept: PODIATRY | Facility: CLINIC | Age: 39
End: 2024-02-19
Payer: MEDICAID

## 2024-02-19 NOTE — TELEPHONE ENCOUNTER
----- Message from Lenore Carrion sent at 2/19/2024  9:59 AM CST -----  Type:  Sooner Apoointment Request    Caller is requesting a sooner appointment.  Caller declined first available appointment listed below.  Caller will not accept being placed on the waitlist and is requesting a message be sent to doctor.  Name of Caller:PT   When is the first available appointment?04/18  Symptoms:8 wk f/u   Would the patient rather a call back or a response via [facility]? Call back  Best Call Back Number:[phone]  Additional Information:

## 2024-03-12 ENCOUNTER — PATIENT MESSAGE (OUTPATIENT)
Dept: OBSTETRICS AND GYNECOLOGY | Facility: CLINIC | Age: 39
End: 2024-03-12
Payer: MEDICAID

## 2024-03-12 ENCOUNTER — TELEPHONE (OUTPATIENT)
Dept: OBSTETRICS AND GYNECOLOGY | Facility: CLINIC | Age: 39
End: 2024-03-12
Payer: MEDICAID

## 2024-03-12 RX ORDER — METRONIDAZOLE 500 MG/1
500 TABLET ORAL EVERY 12 HOURS
Qty: 14 TABLET | Refills: 0 | Status: SHIPPED | OUTPATIENT
Start: 2024-03-12 | End: 2024-03-19

## 2024-03-12 NOTE — TELEPHONE ENCOUNTER
----- Message from Aby Karlie Turner sent at 3/12/2024  1:17 PM CDT -----  Contact: pt  Type:  Needs Medical Advice    Who Called: pt   Symptoms (please be specific): Discharge    How long has patient had these symptoms:  3 days   Pharmacy name and phone #:  [facility] [address] [address] [address]   Phone: [phone]  Fax: [phone]        Would the patient rather a call back or a response via [facility]? Call   Best Call Back Number:  [phone]  Additional Information:      Spoke with patient she is experiencing white discharge , she stated she does have a odor x3 days. No burning or irritation and she has tried no otc meds. Wants to know what should be her next steps.

## 2024-07-26 ENCOUNTER — TELEPHONE (OUTPATIENT)
Dept: OBSTETRICS AND GYNECOLOGY | Facility: CLINIC | Age: 39
End: 2024-07-26
Payer: MEDICAID

## 2024-07-26 NOTE — TELEPHONE ENCOUNTER
----- Message from Socorro Vance sent at 7/25/2024 12:21 PM CDT -----  Type:  Needs Medical Advice    Who Called: Pt  Symptoms (please be specific): would not say   Would the patient rather a call back or a response via [facility]? [phone]

## 2024-07-26 NOTE — TELEPHONE ENCOUNTER
Spoke with pt who is having discharge with odor for a few days. Denies itching/burning. Pt is requesting script to be send to preferred pharmacy.    Please advise

## 2024-10-16 ENCOUNTER — PATIENT MESSAGE (OUTPATIENT)
Dept: OBSTETRICS AND GYNECOLOGY | Facility: CLINIC | Age: 39
End: 2024-10-16

## 2024-10-16 RX ORDER — METRONIDAZOLE 500 MG/1
500 TABLET ORAL EVERY 12 HOURS
Qty: 14 TABLET | Refills: 0 | Status: SHIPPED | OUTPATIENT
Start: 2024-10-16 | End: 2024-10-23

## 2024-11-11 ENCOUNTER — HOSPITAL ENCOUNTER (EMERGENCY)
Facility: HOSPITAL | Age: 39
Discharge: HOME | End: 2024-11-11
Attending: EMERGENCY MEDICINE

## 2024-11-11 VITALS
TEMPERATURE: 98 F | HEIGHT: 69 IN | DIASTOLIC BLOOD PRESSURE: 68 MMHG | BODY MASS INDEX: 28.29 KG/M2 | WEIGHT: 191 LBS | RESPIRATION RATE: 16 BRPM | OXYGEN SATURATION: 100 % | SYSTOLIC BLOOD PRESSURE: 101 MMHG | HEART RATE: 86 BPM

## 2024-11-11 DIAGNOSIS — K43.9 VENTRAL HERNIA WITHOUT OBSTRUCTION OR GANGRENE: Primary | ICD-10-CM

## 2024-11-11 PROCEDURE — 99281 EMR DPT VST MAYX REQ PHY/QHP: CPT

## 2024-11-12 NOTE — ED PROVIDER NOTES
Encounter Date: 2024       History   No chief complaint on file.    39-year-old female presents with ventral hernia pain for the past 45 minutes prior to arrival.  Patient says pain came on all of a sudden.  No vomiting that does feel nauseated.  No fever.  Patient says she had similar episode within the year.  Has seen General surgery for this before as discussed surgery but has not had surgery yet.      Review of patient's allergies indicates:   Allergen Reactions    Codeine Nausea And Vomiting and Nausea Only    Hydrocodone-acetaminophen Nausea Only    Shellfish containing products Anaphylaxis and Swelling    Shellfish derived Swelling    Venom-honey bee Swelling     No past medical history on file.  Past Surgical History:   Procedure Laterality Date     SECTION      CYST REMOVAL      DILATION AND CURETTAGE OF UTERUS      ECTOPIC PREGNANCY SURGERY      ENDOMETRIAL ABLATION N/A 2023    Procedure: ABLATION, ENDOMETRIUM;  Surgeon: Sydnee Thorpe MD;  Location: [facility] OR;  Service: OB/GYN;  Laterality: N/A;    HYSTEROSCOPY N/A 2023    Procedure: HYSTEROSCOPY;  Surgeon: Sydnee Thorpe MD;  Location: [facility] OR;  Service: OB/GYN;  Laterality: N/A;    SALPINGECTOMY Right     SINUS SURGERY      TUBAL LIGATION       Family History   Problem Relation Name Age of Onset    Breast cancer Neg Hx      Colon cancer Neg Hx      Ovarian cancer Neg Hx       Social History     Tobacco Use    Smoking status: Never    Smokeless tobacco: Never   Substance Use Topics    Alcohol use: No    Drug use: No     Review of Systems   Constitutional:  Negative for fever.   Respiratory:  Negative for shortness of breath.    Cardiovascular:  Negative for chest pain.   Gastrointestinal:  Positive for abdominal pain and nausea. Negative for vomiting.   Musculoskeletal:  Negative for back pain and neck pain.   Skin:  Negative for rash.   Neurological:  Negative for headaches.       Physical Exam     Initial Vitals [24  1810]   BP Pulse Resp Temp SpO2   135/86 87 20 97.9 °F (36.6 °C) 100 %      MAP       --         Physical Exam    Nursing note and vitals reviewed.  HENT:   Head: Atraumatic.   Eyes: EOM are normal.   Neck:   Normal range of motion.  Cardiovascular:      Exam reveals no gallop and no friction rub.       No murmur heard.  Pulmonary/Chest: Breath sounds normal. No respiratory distress. She has no wheezes. She has no rhonchi. She has no rales.   Abdominal: Abdomen is soft. Bowel sounds are normal. She exhibits no distension. There is abdominal tenderness.   Reducible ventral hernia.  Pain improved after reduction   Musculoskeletal:         General: Normal range of motion.      Cervical back: Normal range of motion.     Neurological: She is alert and oriented to person, place, and time.   Psychiatric: She has a normal mood and affect.         ED Course   Procedures  Labs Reviewed - No data to display       Imaging Results    None          Medications - No data to display  Medical Decision Making  39-year-old female with known ventral hernia presenting with pain at hernia site for the past hour.  On exam she has a palpable hernia a blood for umbilicus.  With gentle pressure I was able to reduce it with improvement in pain.  No evidence of incarceration or strangulation.  Vital signs stable.  Will refer patient back to General surgery for further evaluation.                                      Clinical Impression:  Final diagnoses:  [K43.9] Ventral hernia without obstruction or gangrene (Primary)          ED Disposition Condition    Discharge Stable          ED Prescriptions    None       Follow-up Information       Follow up With Specialties Details Why Contact Info    Basil Alejandre MD General Surgery Schedule an appointment as soon as possible for a visit in 2 days  [address]  [address]  [facility] [address]  [phone]               Pablo Abdi MD  11/11/24 2361

## 2024-11-12 NOTE — ED NOTES
Patient arrives to the ED w/ complaints of hernia pain x 45 min. Reports getting ready for work when the pain started. Reports 10 out of 10 sharp, stabbing upper periumbilical pain. Reports experiencing similar pain in January, where she came to the ED and had hernia moved into place. Denies taking medication PTA.  Patient requesting a few minutes to get comfortable in room w/ pain. Denies needing anything at this time. Warm blanket given to patient.

## 2024-11-15 NOTE — TELEPHONE ENCOUNTER
----- Message from Ling Bryan sent at 9/26/2022 11:18 AM CDT -----  Contact: Patient  Type:  Appointment Request      Name of Caller:Patient  When is the first available appointment?no appt generated   Symptoms:annual  Would the patient rather a call back or a response via [facility]? Call back  Best Call Back Number:[phone]  Additional Information: Please assist          Addendum  created 11/15/24 0943 by Raymundo Carbone MD    Intraprocedure Meds edited

## 2025-06-20 ENCOUNTER — HOSPITAL ENCOUNTER (EMERGENCY)
Facility: HOSPITAL | Age: 40
Discharge: HOME | End: 2025-06-20
Attending: STUDENT IN AN ORGANIZED HEALTH CARE EDUCATION/TRAINING PROGRAM
Payer: MEDICAID

## 2025-06-20 VITALS
HEIGHT: 64 IN | OXYGEN SATURATION: 99 % | HEART RATE: 65 BPM | SYSTOLIC BLOOD PRESSURE: 112 MMHG | DIASTOLIC BLOOD PRESSURE: 62 MMHG | BODY MASS INDEX: 33.63 KG/M2 | WEIGHT: 197 LBS | TEMPERATURE: 98 F | RESPIRATION RATE: 18 BRPM

## 2025-06-20 DIAGNOSIS — K42.0 INCARCERATED UMBILICAL HERNIA: Primary | ICD-10-CM

## 2025-06-20 DIAGNOSIS — R10.9 ABDOMINAL PAIN, UNSPECIFIED ABDOMINAL LOCATION: ICD-10-CM

## 2025-06-20 LAB
ABSOLUTE EOSINOPHIL (OHS): 0.07 K/UL
ABSOLUTE MONOCYTE (OHS): 0.53 K/UL (ref 0.3–1)
ABSOLUTE NEUTROPHIL COUNT (OHS): 2.61 K/UL (ref 1.8–7.7)
ALBUMIN SERPL BCP-MCNC: 3.6 G/DL (ref 3.5–5.2)
ALP SERPL-CCNC: 56 UNIT/L (ref 40–150)
ALT SERPL W/O P-5'-P-CCNC: 14 UNIT/L (ref 10–44)
ANION GAP (OHS): 7 MMOL/L (ref 8–16)
APTT PPP: 26.5 SECONDS (ref 21–32)
AST SERPL-CCNC: 14 UNIT/L (ref 11–45)
BASOPHILS # BLD AUTO: 0.04 K/UL
BASOPHILS NFR BLD AUTO: 0.8 %
BILIRUB SERPL-MCNC: 0.4 MG/DL (ref 0.1–1)
BUN SERPL-MCNC: 14 MG/DL (ref 6–20)
CALCIUM SERPL-MCNC: 8.1 MG/DL (ref 8.7–10.5)
CHLORIDE SERPL-SCNC: 110 MMOL/L (ref 95–110)
CO2 SERPL-SCNC: 19 MMOL/L (ref 23–29)
CREAT SERPL-MCNC: 0.7 MG/DL (ref 0.5–1.4)
ERYTHROCYTE [DISTWIDTH] IN BLOOD BY AUTOMATED COUNT: 12.6 % (ref 11.5–14.5)
GFR SERPLBLD CREATININE-BSD FMLA CKD-EPI: >60 ML/MIN/1.73/M2
GLUCOSE SERPL-MCNC: 90 MG/DL (ref 70–110)
HCG INTACT+B SERPL-ACNC: <1.2 MIU/ML
HCT VFR BLD AUTO: 32.7 % (ref 37–48.5)
HGB BLD-MCNC: 10.7 GM/DL (ref 12–16)
IMM GRANULOCYTES # BLD AUTO: 0.01 K/UL (ref 0–0.04)
IMM GRANULOCYTES NFR BLD AUTO: 0.2 % (ref 0–0.5)
INR PPP: 1 (ref 0.8–1.2)
LYMPHOCYTES # BLD AUTO: 1.97 K/UL (ref 1–4.8)
MCH RBC QN AUTO: 31.7 PG (ref 27–31)
MCHC RBC AUTO-ENTMCNC: 32.7 G/DL (ref 32–36)
MCV RBC AUTO: 97 FL (ref 82–98)
NUCLEATED RBC (/100WBC) (OHS): 0 /100 WBC
PLATELET # BLD AUTO: 286 K/UL (ref 150–450)
PMV BLD AUTO: 10.9 FL (ref 9.2–12.9)
POTASSIUM SERPL-SCNC: 4.2 MMOL/L (ref 3.5–5.1)
PROT SERPL-MCNC: 6.7 GM/DL (ref 6–8.4)
PROTHROMBIN TIME: 11.1 SECONDS (ref 9–12.5)
RBC # BLD AUTO: 3.38 M/UL (ref 4–5.4)
RELATIVE EOSINOPHIL (OHS): 1.3 %
RELATIVE LYMPHOCYTE (OHS): 37.7 % (ref 18–48)
RELATIVE MONOCYTE (OHS): 10.1 % (ref 4–15)
RELATIVE NEUTROPHIL (OHS): 49.9 % (ref 38–73)
SODIUM SERPL-SCNC: 136 MMOL/L (ref 136–145)
WBC # BLD AUTO: 5.23 K/UL (ref 3.9–12.7)

## 2025-06-20 PROCEDURE — 99152 MOD SED SAME PHYS/QHP 5/>YRS: CPT

## 2025-06-20 PROCEDURE — 25000003 PHARM REV CODE 250: Performed by: STUDENT IN AN ORGANIZED HEALTH CARE EDUCATION/TRAINING PROGRAM

## 2025-06-20 PROCEDURE — 80053 COMPREHEN METABOLIC PANEL: CPT | Performed by: STUDENT IN AN ORGANIZED HEALTH CARE EDUCATION/TRAINING PROGRAM

## 2025-06-20 PROCEDURE — 94761 N-INVAS EAR/PLS OXIMETRY MLT: CPT

## 2025-06-20 PROCEDURE — 63600175 PHARM REV CODE 636 W HCPCS: Performed by: STUDENT IN AN ORGANIZED HEALTH CARE EDUCATION/TRAINING PROGRAM

## 2025-06-20 PROCEDURE — 96374 THER/PROPH/DIAG INJ IV PUSH: CPT

## 2025-06-20 PROCEDURE — 96372 THER/PROPH/DIAG INJ SC/IM: CPT | Performed by: STUDENT IN AN ORGANIZED HEALTH CARE EDUCATION/TRAINING PROGRAM

## 2025-06-20 PROCEDURE — 85730 THROMBOPLASTIN TIME PARTIAL: CPT | Performed by: STUDENT IN AN ORGANIZED HEALTH CARE EDUCATION/TRAINING PROGRAM

## 2025-06-20 PROCEDURE — 96361 HYDRATE IV INFUSION ADD-ON: CPT

## 2025-06-20 PROCEDURE — 84702 CHORIONIC GONADOTROPIN TEST: CPT | Performed by: STUDENT IN AN ORGANIZED HEALTH CARE EDUCATION/TRAINING PROGRAM

## 2025-06-20 PROCEDURE — 85610 PROTHROMBIN TIME: CPT | Performed by: STUDENT IN AN ORGANIZED HEALTH CARE EDUCATION/TRAINING PROGRAM

## 2025-06-20 PROCEDURE — 27000221 HC OXYGEN, UP TO 24 HOURS

## 2025-06-20 PROCEDURE — 85025 COMPLETE CBC W/AUTO DIFF WBC: CPT | Performed by: STUDENT IN AN ORGANIZED HEALTH CARE EDUCATION/TRAINING PROGRAM

## 2025-06-20 PROCEDURE — 99285 EMERGENCY DEPT VISIT HI MDM: CPT | Mod: 25

## 2025-06-20 PROCEDURE — 99900035 HC TECH TIME PER 15 MIN (STAT)

## 2025-06-20 RX ORDER — PROPOFOL 10 MG/ML
0.5 VIAL (ML) INTRAVENOUS ONCE
Status: DISCONTINUED | OUTPATIENT
Start: 2025-06-20 | End: 2025-06-20

## 2025-06-20 RX ORDER — ONDANSETRON HYDROCHLORIDE 2 MG/ML
4 INJECTION, SOLUTION INTRAVENOUS
Status: COMPLETED | OUTPATIENT
Start: 2025-06-20 | End: 2025-06-20

## 2025-06-20 RX ORDER — SODIUM CHLORIDE 9 MG/ML
1000 INJECTION, SOLUTION INTRAVENOUS
Status: COMPLETED | OUTPATIENT
Start: 2025-06-20 | End: 2025-06-20

## 2025-06-20 RX ORDER — HYDROMORPHONE HYDROCHLORIDE 1 MG/ML
1 INJECTION, SOLUTION INTRAMUSCULAR; INTRAVENOUS; SUBCUTANEOUS
Refills: 0 | Status: COMPLETED | OUTPATIENT
Start: 2025-06-20 | End: 2025-06-20

## 2025-06-20 RX ORDER — PROPOFOL 10 MG/ML
1 VIAL (ML) INTRAVENOUS
Status: COMPLETED | OUTPATIENT
Start: 2025-06-20 | End: 2025-06-20

## 2025-06-20 RX ADMIN — ONDANSETRON 4 MG: 2 INJECTION INTRAMUSCULAR; INTRAVENOUS at 04:06

## 2025-06-20 RX ADMIN — HYDROMORPHONE HYDROCHLORIDE 1 MG: 1 INJECTION, SOLUTION INTRAMUSCULAR; INTRAVENOUS; SUBCUTANEOUS at 03:06

## 2025-06-20 RX ADMIN — SODIUM CHLORIDE 1000 ML: 9 INJECTION, SOLUTION INTRAVENOUS at 04:06

## 2025-06-20 RX ADMIN — PROPOFOL 89 MG: 10 INJECTION, EMULSION INTRAVENOUS at 04:06

## 2025-06-20 NOTE — CARE UPDATE
06/20/25 0440   Skin Integrity   $ Wound Care Tech Time 15 min   Area Observed Left;Right;Nares   Skin Appearance without discoloration   PRE-TX-O2   Device (Oxygen Therapy) nasal cannula   $ Is the patient on Low Flow Oxygen? Yes   Flow (L/min) (Oxygen Therapy) 2   Pulse Oximetry Type Continuous   $ Pulse Oximetry - Multiple Charge Pulse Oximetry - Multiple   Airway Assistance   $ Conscious Sedation 30 min  (conscious sedation completed)   Respiratory Evaluation   $ Care Plan Tech Time 15 min

## 2025-06-20 NOTE — CARE UPDATE
06/20/25 0425   Skin Integrity   $ Wound Care Tech Time 15 min   Area Observed Left;Right;Nares   Skin Appearance without discoloration   PRE-TX-O2   Device (Oxygen Therapy) nasal cannula   $ Is the patient on Low Flow Oxygen? Yes   Flow (L/min) (Oxygen Therapy) 2   Pulse Oximetry Type Continuous   $ Pulse Oximetry - Multiple Charge Pulse Oximetry - Multiple   ETCO2   $ ETCO2 Usage Currently wearing;At bedside   ETCO2 (mmHg) 32 mmHg   ETCO2 Device Type Monitor:;Bedside Monitor;Portable Bedside Monitor   Airway Assistance   $ Conscious Sedation 15 min   $ High Risk Delivery   (conscious sedation setup)   Respiratory Evaluation   $ Care Plan Tech Time 15 min

## 2025-06-20 NOTE — Clinical Note
"Janie Maravilla (Delacey)ley was seen and treated in our emergency department on 6/20/2025.  She may return to work on 06/21/2025.  Please Excuse from 6/19/25 to 6/21/25     If you have any questions or concerns, please don't hesitate to call.       RN    "

## 2025-06-20 NOTE — DISCHARGE INSTRUCTIONS
Keep the abdominal packing on for the next 24 hours, after this can be taken off.  Follow up with general surgeon by calling for appointment to discuss elective outpatient repair of the hernia.  I would recommend avoiding any activities that involve heavy lifting until cleared to return to this by surgery or your primary care.

## 2025-06-20 NOTE — ED PROVIDER NOTES
"Encounter Date: 2025       History     Chief Complaint   Patient presents with    Hernia     Pt presents to the ED c/o pain to abdomen r/t hernia popping out     HPI    39-year-old female no pertinent medical history other than a intermittent chronic small ventral hernia presents brought in by self through triage stating "my hernia just popped out and I can not get it back in" reports that it happened half an hour prior to arrival in his extremely painful.  Denies any other systemic or infectious symptoms or other acute complaints.  Review of patient's allergies indicates:   Allergen Reactions    Codeine Nausea And Vomiting and Nausea Only    Hydrocodone-acetaminophen Nausea Only    Shellfish containing products Anaphylaxis and Swelling    Shellfish derived Swelling    Venom-honey bee Swelling     History reviewed. No pertinent past medical history.  Past Surgical History:   Procedure Laterality Date     SECTION      CYST REMOVAL      DILATION AND CURETTAGE OF UTERUS      ECTOPIC PREGNANCY SURGERY      ENDOMETRIAL ABLATION N/A 2023    Procedure: ABLATION, ENDOMETRIUM;  Surgeon: Sydnee Thorpe MD;  Location: [facility] OR;  Service: OB/GYN;  Laterality: N/A;    HYSTEROSCOPY N/A 2023    Procedure: HYSTEROSCOPY;  Surgeon: Sydnee Thorpe MD;  Location: [facility] OR;  Service: OB/GYN;  Laterality: N/A;    SALPINGECTOMY Right     SINUS SURGERY      TUBAL LIGATION       Family History   Problem Relation Name Age of Onset    Breast cancer Neg Hx      Colon cancer Neg Hx      Ovarian cancer Neg Hx       Social History[1] medical surgical family and social    Review of Systems    Complete review of systems was conducted and was negative except as per HPI and as marked  Physical Exam     Initial Vitals [25 0341]   BP Pulse Resp Temp SpO2   133/75 96 18 98.2 °F (36.8 °C) 100 %      MAP       --         Physical Exam   Physical:  General: Awake, alert, no acute distress  Head: Normocephalic, " "atraumatic  Neck: Trachea midline, full range of motion, no meningismus  ENT: Atraumatic, Airway Patent  Cardio: Regular Rate, Regular Rhythm, skin perfusion normal  Chest: Atraumatic, No respiratory distress no use of accessory muscles to breath, normal rate  abdomen is soft however rostral to the umbilicus there is a approximately 8 cm x 8 cm firm ruiz hernia, that is extremely tender, frankly incarcerated  Upper Ext: Atraumatic, Inspection normal, no swelling  Lower Ext: Atraumatic, Inspection normal, no swelling  Neuro: No gross cranial nerve abnormality, no lateralization, no gross sensory or motor deficits  ED Course   Procedural Sedation        Date/Time: 2025 4:37 AM    Performed by: Jason Arguello MD  Authorized by: Jason Arguello MD  Consent Done: Yes  Consent: Verbal consent obtained. Written consent obtained  Risks and benefits: risks, benefits and alternatives were discussed  Consent given by: patient  Patient understanding: patient states understanding of the procedure being performed  Patient consent: the patient's understanding of the procedure matches consent given  Procedure consent: procedure consent matches procedure scheduled  Relevant documents: relevant documents present and verified  Test results: test results available and properly labeled  Site marked: the operative site was marked  Imaging studies: imaging studies available  Required items: required blood products, implants, devices, and special equipment available  Patient identity confirmed: , name, verbally with patient, MRN and provided demographic data  Time out: Immediately prior to procedure a "time out" was called to verify the correct patient, procedure, equipment, support staff and site/side marked as required.  ASA Class: Class 2 - Mild Illness without functional impairment.  Mallampati Score: Class 2 - Visualization of the soft palate, fauces, and uvula.   NPO STATUS:  Date/Time of last solid: 2025 1:30 " AM  Date/Time of last fluid: 6/20/2025 1:30 AM    Equipment: on cardiac monitor., on BP monitor., on CO2 monitor., airway equipment available., suction available., reversal drugs available. and on supplemental oxygen.     Sedation type: moderate (conscious) sedation    Sedatives: propofol  Sedation start date/time: 6/20/2025 4:25 AM  Sedation end date/time: 6/20/2025 4:35 AM  Total Sedation Time (min): 10  Vitals: Vital signs were monitored during sedation.  Complications: No complications.   Comments:  I reduced /suppress level of consciousness was required for management of this emergency medical condition, and this was treated with our standard protocol lysed moderate procedural sedation  Patient/Family history of anesthesia or sedation complications: No     Procedure, reduction of incarcerated hernia, location is ventral.  Risks benefits and alternatives were discussed and consent was obtained to proceed,  it was initially attempted with Trendelenburg, ice, and IM Dilaudid for analgesia, however this was unsuccessful due to intolerance due to pain.  Following this procedural sedation was  performed, see above separate procedural note, and hernia was   Manually reduced.   Patient tolerated procedure without complication was improved after procedure  Labs Reviewed   COMPREHENSIVE METABOLIC PANEL - Abnormal       Result Value    Sodium 136      Potassium 4.2      Chloride 110      CO2 19 (*)     Glucose 90      BUN 14      Creatinine 0.7      Calcium 8.1 (*)     Protein Total 6.7      Albumin 3.6      Bilirubin Total 0.4      ALP 56      AST 14      ALT 14      Anion Gap 7 (*)     eGFR >60     CBC WITH DIFFERENTIAL - Abnormal    WBC 5.23      RBC 3.38 (*)     HGB 10.7 (*)     HCT 32.7 (*)     MCV 97      MCH 31.7 (*)     MCHC 32.7      RDW 12.6      Platelet Count 286      MPV 10.9      Nucleated RBC 0      Neut % 49.9      Lymph % 37.7      Mono % 10.1      Eos % 1.3      Basophil % 0.8      Imm Grans % 0.2       "Neut # 2.61      Lymph # 1.97      Mono # 0.53      Eos # 0.07      Baso # 0.04      Imm Grans # 0.01     APTT - Normal    PTT 26.5     PROTIME-INR - Normal    PT 11.1      INR 1.0     CBC W/ AUTO DIFFERENTIAL    Narrative:     The following orders were created for panel order CBC auto differential.  Procedure                               Abnormality         Status                     ---------                               -----------         ------                     CBC with Differential[[id number]]       Abnormal            Final result                 Please view results for these tests on the individual orders.   HCG, QUANTITATIVE    Beta HCG Quant <1.20            Imaging Results    None          Medications   HYDROmorphone injection 1 mg (1 mg Intramuscular Given 6/20/25 6180)   propofol (DIPRIVAN) 10 mg/mL IVP (89 mg Intravenous Given by Provider 6/20/25 6500)   0.9% NaCl infusion (0 mLs Intravenous Stopped 6/20/25 0514)   ondansetron injection 4 mg (4 mg Intravenous Given 6/20/25 4098)     Medical Decision Making  Amount and/or Complexity of Data Reviewed  Labs: ordered.    Risk  Prescription drug management.                   39-year-old female no pertinent medical history other than a intermittent chronic small ventral hernia presents brought in by self through triage stating "my hernia just popped out and I can not get it back in" reports that it happened half an hour prior to arrival in his extremely painful.  Denies any other systemic or infectious symptoms or other acute complaints.     Hernia was reduced under procedural sedation.  They were observed for the standard 1-1/2 hours after procedures and, after which they are completely back to baseline, clinically sober, and are experiencing no abdominal pain.  They state that they have in fact seen surgeon injury above before and had had recommended surgical repair, but they had not done this at the time as they had told them that they would likely " require an open non laparoscopic repair.  Patient requested referral for 2nd opinion and it was provided.       All studies reviewed, negative for acute pathology requiring intervention. Diagnosis, use of prescription medications, need for follow-up regarding visit today, need to call for follow-up, expected course, and return precautions were all discussed at length in my traditional manner to which patient verbalized understanding and agrees and all questions were answered.          Patient is not driving home.        Clinical Impression:  Final diagnoses:  [K42.0] Incarcerated umbilical hernia (Primary)  [R10.9] Abdominal pain, unspecified abdominal location          ED Disposition Condition    Discharge Stable          ED Prescriptions    None       Follow-up Information       Follow up With Specialties Details Why Contact Info    Enrique Shah MD General Surgery, Endocrinology, Surgery   [address]  [address]  [facility] [address]  [phone]      Basil Alejandre MD General Surgery   [address]  [address]  [facility] [address]  [phone]                     [1]   Social History  Tobacco Use    Smoking status: Never    Smokeless tobacco: Never   Substance Use Topics    Alcohol use: No    Drug use: No        Jason Arguello MD  06/20/25 0615

## 2025-06-21 LAB
OHS QRS DURATION: 74 MS
OHS QTC CALCULATION: 442 MS

## 2025-07-15 ENCOUNTER — TELEPHONE (OUTPATIENT)
Dept: SURGERY | Facility: CLINIC | Age: 40
End: 2025-07-15
Payer: MEDICAID

## 2025-07-15 NOTE — TELEPHONE ENCOUNTER
07/15/25                      1049 am  Call patient no answer. Left a voicemail regarding multiple appointment with Dr. Shah. Please call us back at [phone].

## 2025-07-16 ENCOUNTER — PATIENT MESSAGE (OUTPATIENT)
Facility: CLINIC | Age: 40
End: 2025-07-16
Payer: COMMERCIAL

## 2025-07-16 ENCOUNTER — TELEPHONE (OUTPATIENT)
Facility: CLINIC | Age: 40
End: 2025-07-16
Payer: MEDICAID

## 2025-07-16 NOTE — TELEPHONE ENCOUNTER
07/16/25        1320 pm   Call patient no answer. Appointment for 07/17/25 will be changed to virtually due to the bad weather expected. Please call office if any questions at [phone].

## 2025-07-17 ENCOUNTER — TELEPHONE (OUTPATIENT)
Dept: SURGERY | Facility: CLINIC | Age: 40
End: 2025-07-17
Payer: MEDICAID

## 2025-07-17 ENCOUNTER — OFFICE VISIT (OUTPATIENT)
Dept: SURGERY | Facility: CLINIC | Age: 40
End: 2025-07-17
Payer: MEDICAID

## 2025-07-17 DIAGNOSIS — K42.0 INCARCERATED UMBILICAL HERNIA: ICD-10-CM

## 2025-07-17 PROCEDURE — 99214 OFFICE O/P EST MOD 30 MIN: CPT | Mod: 95,,, | Performed by: STUDENT IN AN ORGANIZED HEALTH CARE EDUCATION/TRAINING PROGRAM

## 2025-07-17 NOTE — TELEPHONE ENCOUNTER
07/17/25       0845 am  Call patient regarding today's visit inform patient Dr. Shah wanted to do in office visit appointment with her to discuss surgery date. Informed patient she is schedule for 08/07/25 at 9:30 am in [place] location. Patient verbalized understanding.

## 2025-07-19 NOTE — PROGRESS NOTES
The patient location is: Home  The chief complaint leading to consultation is: hernia  Visit type: audiovisual    Face to Face time with patient: 20 min  36 minutes of total time spent on the encounter, which includes face to face time and non-face to face time preparing to see the patient (eg, review of tests), Obtaining and/or reviewing separately obtained history, Documenting clinical information in the electronic or other health record, Independently interpreting results (not separately reported) and communicating results to the patient/family/caregiver, or Care coordination (not separately reported).     Each patient to whom he or she provides medical services by telemedicine is:  (1) informed of the relationship between the physician and patient and the respective role of any other health care provider with respect to management of the patient; and (2) notified that he or she may decline to receive medical services by telemedicine and may withdraw from such care at any time.            [facility] History & Physical         Name: Janie Barbosa   : 1985   MRN: [id number]      History of Present Illness       Chief Compliant: umbilical hernia      HPI:  Janie Barbosa is a 39 y.o. female with an umbilical hernia that is symptomatic. She has previously seen Dr. Romero for this and now is thinking of moving forward with surgery.       No past medical history on file.  Past Surgical History:   Procedure Laterality Date     SECTION      CYST REMOVAL      DILATION AND CURETTAGE OF UTERUS      ECTOPIC PREGNANCY SURGERY      ENDOMETRIAL ABLATION N/A 2023    Procedure: ABLATION, ENDOMETRIUM;  Surgeon: Sydnee Thorpe MD;  Location: [facility] OR;  Service: OB/GYN;  Laterality: N/A;    HYSTEROSCOPY N/A 2023    Procedure: HYSTEROSCOPY;  Surgeon: Sydnee Thorpe MD;  Location: [facility] OR;  Service: OB/GYN;  Laterality: N/A;    SALPINGECTOMY Right     SINUS SURGERY      TUBAL LIGATION        Family History   Problem Relation Name Age of Onset    Breast cancer Neg Hx      Colon cancer Neg Hx      Ovarian cancer Neg Hx       Patient is allergic to codeine, hydrocodone-acetaminophen, shellfish containing products, shellfish derived, and venom-honey bee.  Social Drivers of Health     Tobacco Use: Low Risk  (6/20/2025)    Patient History     Smoking Tobacco Use: Never     Smokeless Tobacco Use: Never     Passive Exposure: Not on file   Alcohol Use: Unknown (3/28/2022)    AUDIT-C     Frequency of Alcohol Consumption: Patient declined     Average Number of Drinks: Patient declined     Frequency of Binge Drinking: Patient declined   Financial Resource Strain: Low Risk  (3/28/2022)    Overall Financial Resource Strain (CARDIA)     Difficulty of Paying Living Expenses: Not very hard   Food Insecurity: No Food Insecurity (3/28/2022)    Hunger Vital Sign     Worried About Running Out of Food in the Last Year: Never true     Ran Out of Food in the Last Year: Never true   Transportation Needs: No Transportation Needs (3/28/2022)    PRAPARE - Transportation     Lack of Transportation (Medical): No     Lack of Transportation (Non-Medical): No   Physical Activity: Sufficiently Active (3/28/2022)    Exercise Vital Sign     Days of Exercise per Week: 7 days     Minutes of Exercise per Session: 150+ min   Stress: No Stress Concern Present (3/28/2022)    [employer] - Occupational Stress Questionnaire     Feeling of Stress : Only a little   Housing Stability: Unknown (3/28/2022)    Housing Stability Vital Sign     Unable to Pay for Housing in the Last Year: No     Number of Places Lived in the Last Year: Not on file     Unstable Housing in the Last Year: No   Depression: Not on file   Utilities: Not on file   Health Literacy: Not on file   Social Isolation: Not on file        Home Medications:   Prior to Admission medications    Medication Sig Start Date End Date Taking? Authorizing Provider    EPINEPHrine (EPIPEN) 0.3 mg/0.3 mL AtIn Inject into the muscle. 6/2/23   Provider, Historical   ergocalciferol (ERGOCALCIFEROL) 50,000 unit Cap Take 50,000 Units by mouth every 7 days. 6/26/23   Provider, Historical       Review of Systems: Complete review of systems elicited and pertinent information is in the HPI    Physical Exam:     Vitals:  There were no vitals filed for this visit.          Labs and imaging reviewed and interpreted by me, as refected in HPI and A&P.      Assessment and plan:     39 F with symptomatic umbilical hernia   We discussed options and she desires repair   She will come to the office for an in person visit before we move forward     History, physical exam, laboratory, and radiographic findings were reviewed.        Enrique Shah MD, [facility]  General and Endocrine Surgery         1:49 PM, 7/19/2025

## 2025-08-28 ENCOUNTER — TELEPHONE (OUTPATIENT)
Dept: SURGERY | Facility: CLINIC | Age: 40
End: 2025-08-28
Payer: COMMERCIAL

## (undated) DEVICE — NDL HYPO REG 25G X 1 1/2

## (undated) DEVICE — DRAPE UINDERBUT GRAD PCH

## (undated) DEVICE — DRESSING TELFA N ADH 3X8

## (undated) DEVICE — GLOVE SURG BIOGEL LATEX SZ 7.5

## (undated) DEVICE — COVER OVERHEAD SURG LT BLUE

## (undated) DEVICE — APPLICATOR CHLORAPREP ORN 26ML

## (undated) DEVICE — PACK SURGERY START

## (undated) DEVICE — SUT PDS II 1 CT VIL MONO 27

## (undated) DEVICE — SEE MEDLINE ITEM 146372

## (undated) DEVICE — KIT NOVASURE V5 ENDOMET ABLAT

## (undated) DEVICE — BANDAGE ADHESIVE

## (undated) DEVICE — PANTIES FEMININE NAPKIN LG/XLG

## (undated) DEVICE — SEE MEDLINE ITEM 157117

## (undated) DEVICE — KIT ANTIFOG

## (undated) DEVICE — SEE MEDLINE ITEM 152622

## (undated) DEVICE — SEE MEDLINE ITEM 156955

## (undated) DEVICE — TRAY VAGINAL WET PREP

## (undated) DEVICE — CATH URETHRAL 16FR RED

## (undated) DEVICE — TAPE MEDIPORE 4IN X 2YDS

## (undated) DEVICE — PACK BASIC

## (undated) DEVICE — ELECTRODE REM PLYHSV RETURN 9

## (undated) DEVICE — NDL INSUF ULTRA VERESS 120MM

## (undated) DEVICE — SEE MEDLINE ITEM 146292

## (undated) DEVICE — SUT 0 VICRYL / UR6 (J603)

## (undated) DEVICE — DRAPE LAVH LAPAROSCOPY W/FLUID

## (undated) DEVICE — CONTAINER MULTIPURPOSE/SPECIME

## (undated) DEVICE — MANIFOLD 4 PORT

## (undated) DEVICE — BLADE SURG CARBON STEEL SZ11

## (undated) DEVICE — SEE MEDLINE ITEM 154981

## (undated) DEVICE — SEALER LIGASURE IMPACT 18CM

## (undated) DEVICE — TROCAR ENDOPATH EXCEL DILATING

## (undated) DEVICE — DRAPE SURGICAL STERI IRRG PCH

## (undated) DEVICE — PAD CNTOUR SUP-ABSRB POSTPRTM

## (undated) DEVICE — TRAY FOLEY 16FR INFECTION CONT

## (undated) DEVICE — PAD CURITY MATERNITY PERI

## (undated) DEVICE — TROCAR ENDOPATH EXCEL

## (undated) DEVICE — SEE MEDLINE ITEM 146355

## (undated) DEVICE — PAD ABDOMINAL 5X9 STERILE

## (undated) DEVICE — SYR 10CC LUER LOCK

## (undated) DEVICE — SYR B-D DISP CONTROL 10CC100/C

## (undated) DEVICE — PACK FLUENT DISPOSABLE

## (undated) DEVICE — SUT 0 36IN CHROMIC GUT CT-1

## (undated) DEVICE — SEE MEDLINE ITEM 157131

## (undated) DEVICE — ADHESIVE DERMABOND ADVANCED

## (undated) DEVICE — TUBING SUC UNIV W/CONN 12FT

## (undated) DEVICE — SUT PLN GUT 2-0 CT 27 INCH

## (undated) DEVICE — SEE MEDLINE ITEM 157116

## (undated) DEVICE — IRRIGATOR ENDOSCOPY DISP.

## (undated) DEVICE — Device

## (undated) DEVICE — SUT MONOCRYL 3-0 PS-2 UND

## (undated) DEVICE — GOWN POLY REINF BRTH SLV LG

## (undated) DEVICE — TOWEL OR DISP STRL BLUE 4/PK

## (undated) DEVICE — PAD PREP CUFFED NS 24X48IN

## (undated) DEVICE — TUBING INSUFFLATION 10

## (undated) DEVICE — GLOVE SURGICAL LATEX SZ 7

## (undated) DEVICE — DEVICE ABLATION NOVASURE DISP

## (undated) DEVICE — SLEEVE SCD EXPRESS CALF MEDIUM

## (undated) DEVICE — SPONGE LAP 18X18 PREWASHED